# Patient Record
Sex: MALE | Employment: UNEMPLOYED | URBAN - METROPOLITAN AREA
[De-identification: names, ages, dates, MRNs, and addresses within clinical notes are randomized per-mention and may not be internally consistent; named-entity substitution may affect disease eponyms.]

---

## 2021-01-01 ENCOUNTER — HOSPITAL ENCOUNTER (INPATIENT)
Age: 66
LOS: 2 days | DRG: 871 | End: 2021-12-01
Attending: ANESTHESIOLOGY | Admitting: INTERNAL MEDICINE
Payer: MEDICARE

## 2021-01-01 ENCOUNTER — APPOINTMENT (OUTPATIENT)
Dept: GENERAL RADIOLOGY | Age: 66
DRG: 871 | End: 2021-01-01
Attending: INTERNAL MEDICINE
Payer: MEDICARE

## 2021-01-01 ENCOUNTER — APPOINTMENT (OUTPATIENT)
Dept: GENERAL RADIOLOGY | Age: 66
DRG: 871 | End: 2021-01-01
Attending: STUDENT IN AN ORGANIZED HEALTH CARE EDUCATION/TRAINING PROGRAM
Payer: MEDICARE

## 2021-01-01 ENCOUNTER — APPOINTMENT (OUTPATIENT)
Dept: CT IMAGING | Age: 66
DRG: 871 | End: 2021-01-01
Attending: EMERGENCY MEDICINE
Payer: MEDICARE

## 2021-01-01 ENCOUNTER — APPOINTMENT (OUTPATIENT)
Dept: NON INVASIVE DIAGNOSTICS | Age: 66
DRG: 871 | End: 2021-01-01
Attending: INTERNAL MEDICINE
Payer: MEDICARE

## 2021-01-01 VITALS
DIASTOLIC BLOOD PRESSURE: 75 MMHG | WEIGHT: 195.11 LBS | SYSTOLIC BLOOD PRESSURE: 109 MMHG | HEART RATE: 81 BPM | TEMPERATURE: 97 F | BODY MASS INDEX: 29.57 KG/M2 | OXYGEN SATURATION: 100 % | RESPIRATION RATE: 24 BRPM | HEIGHT: 68 IN

## 2021-01-01 DIAGNOSIS — I21.4 NON-STEMI (NON-ST ELEVATED MYOCARDIAL INFARCTION) (HCC): ICD-10-CM

## 2021-01-01 DIAGNOSIS — E11.65 UNCONTROLLED TYPE 2 DIABETES MELLITUS WITH HYPERGLYCEMIA (HCC): ICD-10-CM

## 2021-01-01 DIAGNOSIS — I63.9 ACUTE CVA (CEREBROVASCULAR ACCIDENT) (HCC): ICD-10-CM

## 2021-01-01 DIAGNOSIS — I21.4 NSTEMI (NON-ST ELEVATED MYOCARDIAL INFARCTION) (HCC): ICD-10-CM

## 2021-01-01 DIAGNOSIS — J96.01 ACUTE RESPIRATORY FAILURE WITH HYPOXIA (HCC): ICD-10-CM

## 2021-01-01 LAB
25(OH)D3 SERPL-MCNC: 30.5 NG/ML (ref 30–100)
ADMINISTERED INITIALS, ADMINIT: NORMAL
ALBUMIN SERPL-MCNC: 3 G/DL (ref 3.5–5)
ALBUMIN SERPL-MCNC: 3 G/DL (ref 3.5–5)
ALBUMIN SERPL-MCNC: 3.2 G/DL (ref 3.5–5)
ALBUMIN/GLOB SERPL: 0.7 {RATIO} (ref 1.1–2.2)
ALBUMIN/GLOB SERPL: 0.9 {RATIO} (ref 1.1–2.2)
ALBUMIN/GLOB SERPL: 0.9 {RATIO} (ref 1.1–2.2)
ALP SERPL-CCNC: 53 U/L (ref 45–117)
ALP SERPL-CCNC: 56 U/L (ref 45–117)
ALP SERPL-CCNC: 73 U/L (ref 45–117)
ALT SERPL-CCNC: 1002 U/L (ref 12–78)
ALT SERPL-CCNC: 1057 U/L (ref 12–78)
ALT SERPL-CCNC: 955 U/L (ref 12–78)
ANION GAP SERPL CALC-SCNC: 10 MMOL/L (ref 5–15)
ANION GAP SERPL CALC-SCNC: 10 MMOL/L (ref 5–15)
ANION GAP SERPL CALC-SCNC: 6 MMOL/L (ref 5–15)
ANION GAP SERPL CALC-SCNC: 8 MMOL/L (ref 5–15)
ANION GAP SERPL CALC-SCNC: 8 MMOL/L (ref 5–15)
APTT PPP: 30.8 SEC (ref 22.1–31)
APTT PPP: 34.5 SEC (ref 22.1–31)
APTT PPP: 42.7 SEC (ref 22.1–31)
APTT PPP: 44.8 SEC (ref 22.1–31)
APTT PPP: 51 SEC (ref 22.1–31)
APTT PPP: 59.3 SEC (ref 22.1–31)
ARTERIAL PATENCY WRIST A: ABNORMAL
ARTERIAL PATENCY WRIST A: ABNORMAL
ARTERIAL PATENCY WRIST A: POSITIVE
AST SERPL-CCNC: 1548 U/L (ref 15–37)
AST SERPL-CCNC: 549 U/L (ref 15–37)
AST SERPL-CCNC: 918 U/L (ref 15–37)
ATRIAL RATE: 103 BPM
ATRIAL RATE: 110 BPM
ATRIAL RATE: 143 BPM
B PERT DNA SPEC QL NAA+PROBE: NOT DETECTED
BACTERIA SPEC CULT: NORMAL
BACTERIA SPEC CULT: NORMAL
BASE DEFICIT BLD-SCNC: 6.6 MMOL/L
BASE DEFICIT BLD-SCNC: 8.5 MMOL/L
BASE DEFICIT BLD-SCNC: 8.8 MMOL/L
BASOPHILS # BLD: 0 K/UL (ref 0–0.1)
BASOPHILS # BLD: 0.1 K/UL (ref 0–0.1)
BASOPHILS # BLD: 0.1 K/UL (ref 0–0.1)
BASOPHILS NFR BLD: 0 % (ref 0–1)
BASOPHILS NFR BLD: 1 % (ref 0–1)
BASOPHILS NFR BLD: 1 % (ref 0–1)
BDY SITE: ABNORMAL
BILIRUB SERPL-MCNC: 0.4 MG/DL (ref 0.2–1)
BILIRUB SERPL-MCNC: 0.5 MG/DL (ref 0.2–1)
BILIRUB SERPL-MCNC: 0.6 MG/DL (ref 0.2–1)
BNP SERPL-MCNC: 1963 PG/ML
BNP SERPL-MCNC: 3385 PG/ML
BNP SERPL-MCNC: 5662 PG/ML
BORDETELLA PARAPERTUSSIS PCR, BORPAR: NOT DETECTED
BUN SERPL-MCNC: 26 MG/DL (ref 6–20)
BUN SERPL-MCNC: 26 MG/DL (ref 6–20)
BUN SERPL-MCNC: 28 MG/DL (ref 6–20)
BUN SERPL-MCNC: 31 MG/DL (ref 6–20)
BUN SERPL-MCNC: 34 MG/DL (ref 6–20)
BUN/CREAT SERPL: 17 (ref 12–20)
BUN/CREAT SERPL: 18 (ref 12–20)
BUN/CREAT SERPL: 20 (ref 12–20)
C PNEUM DNA SPEC QL NAA+PROBE: NOT DETECTED
CALCIUM SERPL-MCNC: 7.6 MG/DL (ref 8.5–10.1)
CALCIUM SERPL-MCNC: 7.9 MG/DL (ref 8.5–10.1)
CALCIUM SERPL-MCNC: 7.9 MG/DL (ref 8.5–10.1)
CALCIUM SERPL-MCNC: 8.1 MG/DL (ref 8.5–10.1)
CALCIUM SERPL-MCNC: 8.3 MG/DL (ref 8.5–10.1)
CALCULATED P AXIS, ECG09: 51 DEGREES
CALCULATED P AXIS, ECG09: 8 DEGREES
CALCULATED R AXIS, ECG10: 13 DEGREES
CALCULATED R AXIS, ECG10: 20 DEGREES
CALCULATED R AXIS, ECG10: 42 DEGREES
CALCULATED T AXIS, ECG11: 123 DEGREES
CALCULATED T AXIS, ECG11: 130 DEGREES
CALCULATED T AXIS, ECG11: 159 DEGREES
CHLORIDE SERPL-SCNC: 102 MMOL/L (ref 97–108)
CHLORIDE SERPL-SCNC: 104 MMOL/L (ref 97–108)
CHLORIDE SERPL-SCNC: 108 MMOL/L (ref 97–108)
CHLORIDE SERPL-SCNC: 108 MMOL/L (ref 97–108)
CHLORIDE SERPL-SCNC: 109 MMOL/L (ref 97–108)
CHOLEST SERPL-MCNC: 153 MG/DL
CO2 SERPL-SCNC: 18 MMOL/L (ref 21–32)
CO2 SERPL-SCNC: 20 MMOL/L (ref 21–32)
CO2 SERPL-SCNC: 20 MMOL/L (ref 21–32)
CREAT SERPL-MCNC: 1.27 MG/DL (ref 0.7–1.3)
CREAT SERPL-MCNC: 1.32 MG/DL (ref 0.7–1.3)
CREAT SERPL-MCNC: 1.37 MG/DL (ref 0.7–1.3)
CREAT SERPL-MCNC: 1.82 MG/DL (ref 0.7–1.3)
CREAT SERPL-MCNC: 1.92 MG/DL (ref 0.7–1.3)
D50 ADMINISTERED, D50ADM: 0 ML
D50 ORDER, D50ORD: 0 ML
DIAGNOSIS, 93000: NORMAL
DIFFERENTIAL METHOD BLD: ABNORMAL
ECHO AO ROOT DIAM: 3.01 CM
ECHO AV PEAK GRADIENT: 4.5 MMHG
ECHO AV PEAK VELOCITY: 106.04 CM/S
ECHO LA MAJOR AXIS: 3.16 CM
ECHO LA MINOR AXIS: 1.57 CM
ECHO LV E' LATERAL VELOCITY: 5.16 CM/S
ECHO LV E' SEPTAL VELOCITY: 4.31 CM/S
ECHO LV EDV A2C: 154.78 ML
ECHO LV EDV A4C: 128.4 ML
ECHO LV EDV BP: 143.91 ML (ref 67–155)
ECHO LV EDV INDEX A4C: 63.9 ML/M2
ECHO LV EDV INDEX BP: 71.6 ML/M2
ECHO LV EDV NDEX A2C: 77 ML/M2
ECHO LV EJECTION FRACTION A2C: 38 PERCENT
ECHO LV EJECTION FRACTION A4C: 25 PERCENT
ECHO LV EJECTION FRACTION BIPLANE: 32.7 PERCENT (ref 55–100)
ECHO LV ESV A2C: 95.61 ML
ECHO LV ESV A4C: 95.92 ML
ECHO LV ESV BP: 96.8 ML (ref 22–58)
ECHO LV ESV INDEX A2C: 47.6 ML/M2
ECHO LV ESV INDEX A4C: 47.7 ML/M2
ECHO LV ESV INDEX BP: 48.2 ML/M2
ECHO LV INTERNAL DIMENSION DIASTOLIC: 5.05 CM (ref 4.2–5.9)
ECHO LV INTERNAL DIMENSION SYSTOLIC: 4.34 CM
ECHO LV IVSD: 1.11 CM (ref 0.6–1)
ECHO LV MASS 2D: 192.5 G (ref 88–224)
ECHO LV MASS INDEX 2D: 95.8 G/M2 (ref 49–115)
ECHO LV POSTERIOR WALL DIASTOLIC: 0.95 CM (ref 0.6–1)
ECHO LVOT PEAK GRADIENT: 1.25 MMHG
ECHO LVOT PEAK VELOCITY: 55.87 CM/S
ECHO MV A VELOCITY: 47.61 CM/S
ECHO MV AREA PHT: 9.31 CM2
ECHO MV E DECELERATION TIME (DT): 81.47 MS
ECHO MV E VELOCITY: 64.3 CM/S
ECHO MV E/A RATIO: 1.35
ECHO MV E/E' LATERAL: 12.46
ECHO MV E/E' RATIO (AVERAGED): 13.69
ECHO MV E/E' SEPTAL: 14.92
ECHO MV PRESSURE HALF TIME (PHT): 23.63 MS
ECHO PV MAX VELOCITY: 77.35 CM/S
ECHO PV PEAK INSTANTANEOUS GRADIENT SYSTOLIC: 2.39 MMHG
ECHO RV TAPSE: 1.58 CM (ref 1.5–2)
EOSINOPHIL # BLD: 0 K/UL (ref 0–0.4)
EOSINOPHIL # BLD: 0 K/UL (ref 0–0.4)
EOSINOPHIL # BLD: 0.1 K/UL (ref 0–0.4)
EOSINOPHIL NFR BLD: 0 % (ref 0–7)
EOSINOPHIL NFR BLD: 0 % (ref 0–7)
EOSINOPHIL NFR BLD: 1 % (ref 0–7)
ERYTHROCYTE [DISTWIDTH] IN BLOOD BY AUTOMATED COUNT: 11.9 % (ref 11.5–14.5)
ERYTHROCYTE [DISTWIDTH] IN BLOOD BY AUTOMATED COUNT: 11.9 % (ref 11.5–14.5)
ERYTHROCYTE [DISTWIDTH] IN BLOOD BY AUTOMATED COUNT: 12.2 % (ref 11.5–14.5)
EST. AVERAGE GLUCOSE BLD GHB EST-MCNC: 212 MG/DL
FLUAV H1 2009 PAND RNA SPEC QL NAA+PROBE: NOT DETECTED
FLUAV H1 RNA SPEC QL NAA+PROBE: NOT DETECTED
FLUAV H3 RNA SPEC QL NAA+PROBE: NOT DETECTED
FLUAV SUBTYP SPEC NAA+PROBE: NOT DETECTED
FLUBV RNA SPEC QL NAA+PROBE: NOT DETECTED
GAS FLOW.O2 O2 DELIVERY SYS: ABNORMAL L/MIN
GAS FLOW.O2 SETTING OXYMISER: 16 BPM
GAS FLOW.O2 SETTING OXYMISER: 16 BPM
GAS FLOW.O2 SETTING OXYMISER: 18 BPM
GLOBULIN SER CALC-MCNC: 3.3 G/DL (ref 2–4)
GLOBULIN SER CALC-MCNC: 3.6 G/DL (ref 2–4)
GLOBULIN SER CALC-MCNC: 4.4 G/DL (ref 2–4)
GLSCOM COMMENTS: NORMAL
GLUCOSE BLD STRIP.AUTO-MCNC: 124 MG/DL (ref 65–117)
GLUCOSE BLD STRIP.AUTO-MCNC: 154 MG/DL (ref 65–117)
GLUCOSE BLD STRIP.AUTO-MCNC: 160 MG/DL (ref 65–117)
GLUCOSE BLD STRIP.AUTO-MCNC: 163 MG/DL (ref 65–117)
GLUCOSE BLD STRIP.AUTO-MCNC: 175 MG/DL (ref 65–117)
GLUCOSE BLD STRIP.AUTO-MCNC: 190 MG/DL (ref 65–117)
GLUCOSE BLD STRIP.AUTO-MCNC: 196 MG/DL (ref 65–117)
GLUCOSE BLD STRIP.AUTO-MCNC: 197 MG/DL (ref 65–117)
GLUCOSE BLD STRIP.AUTO-MCNC: 206 MG/DL (ref 65–117)
GLUCOSE BLD STRIP.AUTO-MCNC: 222 MG/DL (ref 65–117)
GLUCOSE BLD STRIP.AUTO-MCNC: 224 MG/DL (ref 65–117)
GLUCOSE BLD STRIP.AUTO-MCNC: 228 MG/DL (ref 65–117)
GLUCOSE BLD STRIP.AUTO-MCNC: 233 MG/DL (ref 65–117)
GLUCOSE SERPL-MCNC: 188 MG/DL (ref 65–100)
GLUCOSE SERPL-MCNC: 222 MG/DL (ref 65–100)
GLUCOSE SERPL-MCNC: 243 MG/DL (ref 65–100)
GLUCOSE SERPL-MCNC: 247 MG/DL (ref 65–100)
GLUCOSE SERPL-MCNC: 255 MG/DL (ref 65–100)
GLUCOSE, GLC: 233 MG/DL
HADV DNA SPEC QL NAA+PROBE: NOT DETECTED
HBA1C MFR BLD: 9 % (ref 4–5.6)
HCO3 BLD-SCNC: 17.1 MMOL/L (ref 22–26)
HCO3 BLD-SCNC: 18.3 MMOL/L (ref 22–26)
HCO3 BLD-SCNC: 20.6 MMOL/L (ref 22–26)
HCOV 229E RNA SPEC QL NAA+PROBE: NOT DETECTED
HCOV HKU1 RNA SPEC QL NAA+PROBE: NOT DETECTED
HCOV NL63 RNA SPEC QL NAA+PROBE: NOT DETECTED
HCOV OC43 RNA SPEC QL NAA+PROBE: NOT DETECTED
HCT VFR BLD AUTO: 34.3 % (ref 36.6–50.3)
HCT VFR BLD AUTO: 38.4 % (ref 36.6–50.3)
HCT VFR BLD AUTO: 39.1 % (ref 36.6–50.3)
HDLC SERPL-MCNC: 34 MG/DL
HDLC SERPL: 4.5 {RATIO} (ref 0–5)
HGB BLD-MCNC: 11.6 G/DL (ref 12.1–17)
HGB BLD-MCNC: 12.8 G/DL (ref 12.1–17)
HGB BLD-MCNC: 13 G/DL (ref 12.1–17)
HIGH TARGET, HITG: 250 MG/DL
HMPV RNA SPEC QL NAA+PROBE: NOT DETECTED
HPIV1 RNA SPEC QL NAA+PROBE: NOT DETECTED
HPIV2 RNA SPEC QL NAA+PROBE: NOT DETECTED
HPIV3 RNA SPEC QL NAA+PROBE: NOT DETECTED
HPIV4 RNA SPEC QL NAA+PROBE: NOT DETECTED
IMM GRANULOCYTES # BLD AUTO: 0 K/UL (ref 0–0.04)
IMM GRANULOCYTES # BLD AUTO: 0.1 K/UL (ref 0–0.04)
IMM GRANULOCYTES # BLD AUTO: 0.1 K/UL (ref 0–0.04)
IMM GRANULOCYTES NFR BLD AUTO: 0 % (ref 0–0.5)
IMM GRANULOCYTES NFR BLD AUTO: 1 % (ref 0–0.5)
IMM GRANULOCYTES NFR BLD AUTO: 1 % (ref 0–0.5)
INSULIN ADMINSTERED, INSADM: 3.5 UNITS/HOUR
INSULIN ORDER, INSORD: 3.5 UNITS/HOUR
L PNEUMO1 AG UR QL IA: NEGATIVE
LACTATE SERPL-SCNC: 1.1 MMOL/L (ref 0.4–2)
LACTATE SERPL-SCNC: 1.3 MMOL/L (ref 0.4–2)
LACTATE SERPL-SCNC: 1.5 MMOL/L (ref 0.4–2)
LDLC SERPL CALC-MCNC: 66 MG/DL (ref 0–100)
LOW TARGET, LOT: 150 MG/DL
LYMPHOCYTES # BLD: 0.9 K/UL (ref 0.8–3.5)
LYMPHOCYTES # BLD: 2 K/UL (ref 0.8–3.5)
LYMPHOCYTES # BLD: 3 K/UL (ref 0.8–3.5)
LYMPHOCYTES NFR BLD: 20 % (ref 12–49)
LYMPHOCYTES NFR BLD: 21 % (ref 12–49)
LYMPHOCYTES NFR BLD: 7 % (ref 12–49)
M PNEUMO DNA SPEC QL NAA+PROBE: NOT DETECTED
M PNEUMO IGM SER IA-ACNC: NONREACTIVE
MAGNESIUM SERPL-MCNC: 1.6 MG/DL (ref 1.6–2.4)
MAGNESIUM SERPL-MCNC: 1.6 MG/DL (ref 1.6–2.4)
MAGNESIUM SERPL-MCNC: 1.8 MG/DL (ref 1.6–2.4)
MAGNESIUM SERPL-MCNC: 1.9 MG/DL (ref 1.6–2.4)
MCH RBC QN AUTO: 30.7 PG (ref 26–34)
MCH RBC QN AUTO: 30.9 PG (ref 26–34)
MCH RBC QN AUTO: 31.2 PG (ref 26–34)
MCHC RBC AUTO-ENTMCNC: 33.2 G/DL (ref 30–36.5)
MCHC RBC AUTO-ENTMCNC: 33.3 G/DL (ref 30–36.5)
MCHC RBC AUTO-ENTMCNC: 33.8 G/DL (ref 30–36.5)
MCV RBC AUTO: 92.2 FL (ref 80–99)
MCV RBC AUTO: 92.4 FL (ref 80–99)
MCV RBC AUTO: 92.8 FL (ref 80–99)
MINUTES UNTIL NEXT BG, NBG: 60 MIN
MONOCYTES # BLD: 0.7 K/UL (ref 0–1)
MONOCYTES # BLD: 1 K/UL (ref 0–1)
MONOCYTES # BLD: 1.6 K/UL (ref 0–1)
MONOCYTES NFR BLD: 10 % (ref 5–13)
MONOCYTES NFR BLD: 11 % (ref 5–13)
MONOCYTES NFR BLD: 6 % (ref 5–13)
MULTIPLIER, MUL: 0.02
NEUTS SEG # BLD: 11 K/UL (ref 1.8–8)
NEUTS SEG # BLD: 7 K/UL (ref 1.8–8)
NEUTS SEG # BLD: 9.6 K/UL (ref 1.8–8)
NEUTS SEG NFR BLD: 67 % (ref 32–75)
NEUTS SEG NFR BLD: 67 % (ref 32–75)
NEUTS SEG NFR BLD: 86 % (ref 32–75)
NRBC # BLD: 0 K/UL (ref 0–0.01)
NRBC BLD-RTO: 0 PER 100 WBC
O2/TOTAL GAS SETTING VFR VENT: 100 %
O2/TOTAL GAS SETTING VFR VENT: 70 %
O2/TOTAL GAS SETTING VFR VENT: 90 %
ORDER INITIALS, ORDINIT: NORMAL
OSMOLALITY SERPL: 333 MOSM/KG H2O
P-R INTERVAL, ECG05: 112 MS
P-R INTERVAL, ECG05: 146 MS
P-R INTERVAL, ECG05: 166 MS
PCO2 BLD: 34.7 MMHG (ref 35–45)
PCO2 BLD: 42.9 MMHG (ref 35–45)
PCO2 BLD: 46.4 MMHG (ref 35–45)
PEEP RESPIRATORY: 14 CMH2O
PH BLD: 7.24 [PH] (ref 7.35–7.45)
PH BLD: 7.26 [PH] (ref 7.35–7.45)
PH BLD: 7.3 [PH] (ref 7.35–7.45)
PHOSPHATE SERPL-MCNC: 2.7 MG/DL (ref 2.6–4.7)
PLATELET # BLD AUTO: 199 K/UL (ref 150–400)
PLATELET # BLD AUTO: 220 K/UL (ref 150–400)
PLATELET # BLD AUTO: 271 K/UL (ref 150–400)
PMV BLD AUTO: 8.6 FL (ref 8.9–12.9)
PMV BLD AUTO: 9 FL (ref 8.9–12.9)
PMV BLD AUTO: 9.1 FL (ref 8.9–12.9)
PO2 BLD: 104 MMHG (ref 80–100)
PO2 BLD: 51 MMHG (ref 80–100)
PO2 BLD: 63 MMHG (ref 80–100)
POTASSIUM SERPL-SCNC: 4.3 MMOL/L (ref 3.5–5.1)
POTASSIUM SERPL-SCNC: 4.4 MMOL/L (ref 3.5–5.1)
POTASSIUM SERPL-SCNC: 4.8 MMOL/L (ref 3.5–5.1)
POTASSIUM SERPL-SCNC: 5 MMOL/L (ref 3.5–5.1)
POTASSIUM SERPL-SCNC: 5.1 MMOL/L (ref 3.5–5.1)
PROCALCITONIN SERPL-MCNC: 0.85 NG/ML
PROT SERPL-MCNC: 6.3 G/DL (ref 6.4–8.2)
PROT SERPL-MCNC: 6.8 G/DL (ref 6.4–8.2)
PROT SERPL-MCNC: 7.4 G/DL (ref 6.4–8.2)
Q-T INTERVAL, ECG07: 310 MS
Q-T INTERVAL, ECG07: 356 MS
Q-T INTERVAL, ECG07: 368 MS
QRS DURATION, ECG06: 118 MS
QRS DURATION, ECG06: 122 MS
QRS DURATION, ECG06: 128 MS
QTC CALCULATION (BEZET), ECG08: 478 MS
QTC CALCULATION (BEZET), ECG08: 481 MS
QTC CALCULATION (BEZET), ECG08: 482 MS
RBC # BLD AUTO: 3.72 M/UL (ref 4.1–5.7)
RBC # BLD AUTO: 4.14 M/UL (ref 4.1–5.7)
RBC # BLD AUTO: 4.23 M/UL (ref 4.1–5.7)
RSV RNA SPEC QL NAA+PROBE: NOT DETECTED
RV+EV RNA SPEC QL NAA+PROBE: DETECTED
SAO2 % BLD: 79.4 % (ref 92–97)
SAO2 % BLD: 87.3 % (ref 92–97)
SAO2 % BLD: 97.4 % (ref 92–97)
SARS-COV-2 PCR, COVPCR: NOT DETECTED
SERVICE CMNT-IMP: ABNORMAL
SERVICE CMNT-IMP: NORMAL
SODIUM SERPL-SCNC: 132 MMOL/L (ref 136–145)
SODIUM SERPL-SCNC: 132 MMOL/L (ref 136–145)
SODIUM SERPL-SCNC: 134 MMOL/L (ref 136–145)
SODIUM SERPL-SCNC: 134 MMOL/L (ref 136–145)
SODIUM SERPL-SCNC: 135 MMOL/L (ref 136–145)
SPECIMEN SOURCE: NORMAL
SPECIMEN TYPE: ABNORMAL
THERAPEUTIC RANGE,PTTT: ABNORMAL SECS (ref 58–77)
THERAPEUTIC RANGE,PTTT: NORMAL SECS (ref 58–77)
TRIGL SERPL-MCNC: 265 MG/DL (ref ?–150)
TROPONIN-HIGH SENSITIVITY: 1915 NG/L (ref 0–76)
TROPONIN-HIGH SENSITIVITY: 6378 NG/L (ref 0–76)
TROPONIN-HIGH SENSITIVITY: ABNORMAL NG/L (ref 0–76)
TSH SERPL DL<=0.05 MIU/L-ACNC: 0.84 UIU/ML (ref 0.36–3.74)
VANCOMYCIN SERPL-MCNC: 7.9 UG/ML
VENTILATION MODE VENT: ABNORMAL
VENTRICULAR RATE, ECG03: 103 BPM
VENTRICULAR RATE, ECG03: 110 BPM
VENTRICULAR RATE, ECG03: 143 BPM
VLDLC SERPL CALC-MCNC: 53 MG/DL
VT SETTING VENT: 420 ML
VT SETTING VENT: 450 ML
VT SETTING VENT: 480 ML
WBC # BLD AUTO: 10.1 K/UL (ref 4.1–11.1)
WBC # BLD AUTO: 12.7 K/UL (ref 4.1–11.1)
WBC # BLD AUTO: 14.2 K/UL (ref 4.1–11.1)

## 2021-01-01 PROCEDURE — 83880 ASSAY OF NATRIURETIC PEPTIDE: CPT

## 2021-01-01 PROCEDURE — 85730 THROMBOPLASTIN TIME PARTIAL: CPT

## 2021-01-01 PROCEDURE — 85025 COMPLETE CBC W/AUTO DIFF WBC: CPT

## 2021-01-01 PROCEDURE — 82803 BLOOD GASES ANY COMBINATION: CPT

## 2021-01-01 PROCEDURE — 87040 BLOOD CULTURE FOR BACTERIA: CPT

## 2021-01-01 PROCEDURE — 36415 COLL VENOUS BLD VENIPUNCTURE: CPT

## 2021-01-01 PROCEDURE — 99233 SBSQ HOSP IP/OBS HIGH 50: CPT | Performed by: INTERNAL MEDICINE

## 2021-01-01 PROCEDURE — 83605 ASSAY OF LACTIC ACID: CPT

## 2021-01-01 PROCEDURE — 0BH17EZ INSERTION OF ENDOTRACHEAL AIRWAY INTO TRACHEA, VIA NATURAL OR ARTIFICIAL OPENING: ICD-10-PCS | Performed by: STUDENT IN AN ORGANIZED HEALTH CARE EDUCATION/TRAINING PROGRAM

## 2021-01-01 PROCEDURE — 74011000250 HC RX REV CODE- 250: Performed by: INTERNAL MEDICINE

## 2021-01-01 PROCEDURE — 74011250636 HC RX REV CODE- 250/636: Performed by: EMERGENCY MEDICINE

## 2021-01-01 PROCEDURE — 74011250636 HC RX REV CODE- 250/636: Performed by: INTERNAL MEDICINE

## 2021-01-01 PROCEDURE — 74011636637 HC RX REV CODE- 636/637: Performed by: INTERNAL MEDICINE

## 2021-01-01 PROCEDURE — 0202U NFCT DS 22 TRGT SARS-COV-2: CPT

## 2021-01-01 PROCEDURE — 80061 LIPID PANEL: CPT

## 2021-01-01 PROCEDURE — 99223 1ST HOSP IP/OBS HIGH 75: CPT | Performed by: PSYCHIATRY & NEUROLOGY

## 2021-01-01 PROCEDURE — P9045 ALBUMIN (HUMAN), 5%, 250 ML: HCPCS | Performed by: STUDENT IN AN ORGANIZED HEALTH CARE EDUCATION/TRAINING PROGRAM

## 2021-01-01 PROCEDURE — 74011000250 HC RX REV CODE- 250: Performed by: EMERGENCY MEDICINE

## 2021-01-01 PROCEDURE — 94002 VENT MGMT INPAT INIT DAY: CPT

## 2021-01-01 PROCEDURE — 74011250637 HC RX REV CODE- 250/637: Performed by: INTERNAL MEDICINE

## 2021-01-01 PROCEDURE — 84484 ASSAY OF TROPONIN QUANT: CPT

## 2021-01-01 PROCEDURE — 80053 COMPREHEN METABOLIC PANEL: CPT

## 2021-01-01 PROCEDURE — 71045 X-RAY EXAM CHEST 1 VIEW: CPT

## 2021-01-01 PROCEDURE — 84443 ASSAY THYROID STIM HORMONE: CPT

## 2021-01-01 PROCEDURE — C8929 TTE W OR WO FOL WCON,DOPPLER: HCPCS

## 2021-01-01 PROCEDURE — 84145 PROCALCITONIN (PCT): CPT

## 2021-01-01 PROCEDURE — 83735 ASSAY OF MAGNESIUM: CPT

## 2021-01-01 PROCEDURE — 74011636637 HC RX REV CODE- 636/637: Performed by: EMERGENCY MEDICINE

## 2021-01-01 PROCEDURE — 87449 NOS EACH ORGANISM AG IA: CPT

## 2021-01-01 PROCEDURE — 82962 GLUCOSE BLOOD TEST: CPT

## 2021-01-01 PROCEDURE — 74011250637 HC RX REV CODE- 250/637: Performed by: STUDENT IN AN ORGANIZED HEALTH CARE EDUCATION/TRAINING PROGRAM

## 2021-01-01 PROCEDURE — 82306 VITAMIN D 25 HYDROXY: CPT

## 2021-01-01 PROCEDURE — 74011000258 HC RX REV CODE- 258: Performed by: INTERNAL MEDICINE

## 2021-01-01 PROCEDURE — 74011000258 HC RX REV CODE- 258: Performed by: EMERGENCY MEDICINE

## 2021-01-01 PROCEDURE — 86738 MYCOPLASMA ANTIBODY: CPT

## 2021-01-01 PROCEDURE — 71250 CT THORAX DX C-: CPT

## 2021-01-01 PROCEDURE — 74011250637 HC RX REV CODE- 250/637: Performed by: NURSE PRACTITIONER

## 2021-01-01 PROCEDURE — 93005 ELECTROCARDIOGRAM TRACING: CPT

## 2021-01-01 PROCEDURE — 74011000250 HC RX REV CODE- 250

## 2021-01-01 PROCEDURE — 94003 VENT MGMT INPAT SUBQ DAY: CPT

## 2021-01-01 PROCEDURE — 70450 CT HEAD/BRAIN W/O DYE: CPT

## 2021-01-01 PROCEDURE — 74011250637 HC RX REV CODE- 250/637: Performed by: EMERGENCY MEDICINE

## 2021-01-01 PROCEDURE — 74011000250 HC RX REV CODE- 250: Performed by: NURSE PRACTITIONER

## 2021-01-01 PROCEDURE — 99223 1ST HOSP IP/OBS HIGH 75: CPT | Performed by: INTERNAL MEDICINE

## 2021-01-01 PROCEDURE — 77030018798 HC PMP KT ENTRL FED COVD -A

## 2021-01-01 PROCEDURE — 65620000000 HC RM CCU GENERAL

## 2021-01-01 PROCEDURE — 83930 ASSAY OF BLOOD OSMOLALITY: CPT

## 2021-01-01 PROCEDURE — 36600 WITHDRAWAL OF ARTERIAL BLOOD: CPT

## 2021-01-01 PROCEDURE — 74018 RADEX ABDOMEN 1 VIEW: CPT

## 2021-01-01 PROCEDURE — 84100 ASSAY OF PHOSPHORUS: CPT

## 2021-01-01 PROCEDURE — 74011250636 HC RX REV CODE- 250/636: Performed by: STUDENT IN AN ORGANIZED HEALTH CARE EDUCATION/TRAINING PROGRAM

## 2021-01-01 PROCEDURE — 5A1945Z RESPIRATORY VENTILATION, 24-96 CONSECUTIVE HOURS: ICD-10-PCS | Performed by: STUDENT IN AN ORGANIZED HEALTH CARE EDUCATION/TRAINING PROGRAM

## 2021-01-01 PROCEDURE — 93306 TTE W/DOPPLER COMPLETE: CPT | Performed by: INTERNAL MEDICINE

## 2021-01-01 PROCEDURE — 80202 ASSAY OF VANCOMYCIN: CPT

## 2021-01-01 PROCEDURE — 83036 HEMOGLOBIN GLYCOSYLATED A1C: CPT

## 2021-01-01 RX ORDER — DEXTROSE 50 % IN WATER (D50W) INTRAVENOUS SYRINGE
25-50 AS NEEDED
Status: DISCONTINUED | OUTPATIENT
Start: 2021-01-01 | End: 2021-01-01

## 2021-01-01 RX ORDER — METFORMIN HYDROCHLORIDE 1000 MG/1
1000 TABLET ORAL 2 TIMES DAILY WITH MEALS
COMMUNITY

## 2021-01-01 RX ORDER — IBUPROFEN 800 MG/1
800 TABLET ORAL
COMMUNITY

## 2021-01-01 RX ORDER — UREA 10 %
100 LOTION (ML) TOPICAL DAILY
COMMUNITY

## 2021-01-01 RX ORDER — MORPHINE SULFATE 2 MG/ML
2 INJECTION, SOLUTION INTRAMUSCULAR; INTRAVENOUS
Status: DISCONTINUED | OUTPATIENT
Start: 2021-01-01 | End: 2021-12-02 | Stop reason: HOSPADM

## 2021-01-01 RX ORDER — ONDANSETRON 2 MG/ML
4 INJECTION INTRAMUSCULAR; INTRAVENOUS
Status: DISCONTINUED | OUTPATIENT
Start: 2021-01-01 | End: 2021-12-02 | Stop reason: HOSPADM

## 2021-01-01 RX ORDER — SODIUM CHLORIDE 0.9 % (FLUSH) 0.9 %
5-40 SYRINGE (ML) INJECTION EVERY 8 HOURS
Status: DISCONTINUED | OUTPATIENT
Start: 2021-01-01 | End: 2021-01-01

## 2021-01-01 RX ORDER — GABAPENTIN 300 MG/1
300 CAPSULE ORAL 3 TIMES DAILY
Status: DISCONTINUED | OUTPATIENT
Start: 2021-01-01 | End: 2021-01-01

## 2021-01-01 RX ORDER — FAMOTIDINE 20 MG/1
20 TABLET, FILM COATED ORAL EVERY 12 HOURS
Status: DISCONTINUED | OUTPATIENT
Start: 2021-01-01 | End: 2021-01-01

## 2021-01-01 RX ORDER — BUMETANIDE 0.25 MG/ML
1 INJECTION INTRAMUSCULAR; INTRAVENOUS 2 TIMES DAILY
Status: DISCONTINUED | OUTPATIENT
Start: 2021-01-01 | End: 2021-01-01

## 2021-01-01 RX ORDER — MAGNESIUM SULFATE 100 %
4 CRYSTALS MISCELLANEOUS AS NEEDED
Status: DISCONTINUED | OUTPATIENT
Start: 2021-01-01 | End: 2021-12-02 | Stop reason: HOSPADM

## 2021-01-01 RX ORDER — MANNITOL 20 G/100ML
1 INJECTION, SOLUTION INTRAVENOUS ONCE
Status: COMPLETED | OUTPATIENT
Start: 2021-01-01 | End: 2021-01-01

## 2021-01-01 RX ORDER — ONDANSETRON 4 MG/1
4 TABLET, ORALLY DISINTEGRATING ORAL
Status: DISCONTINUED | OUTPATIENT
Start: 2021-01-01 | End: 2021-12-02 | Stop reason: HOSPADM

## 2021-01-01 RX ORDER — MAGNESIUM SULFATE 100 %
4 CRYSTALS MISCELLANEOUS AS NEEDED
Status: DISCONTINUED | OUTPATIENT
Start: 2021-01-01 | End: 2021-01-01

## 2021-01-01 RX ORDER — CHLORHEXIDINE GLUCONATE 0.12 MG/ML
15 RINSE ORAL EVERY 12 HOURS
Status: DISCONTINUED | OUTPATIENT
Start: 2021-01-01 | End: 2021-12-02 | Stop reason: HOSPADM

## 2021-01-01 RX ORDER — FUROSEMIDE 10 MG/ML
40 INJECTION INTRAMUSCULAR; INTRAVENOUS ONCE
Status: COMPLETED | OUTPATIENT
Start: 2021-01-01 | End: 2021-01-01

## 2021-01-01 RX ORDER — HEPARIN SODIUM 1000 [USP'U]/ML
2000 INJECTION, SOLUTION INTRAVENOUS; SUBCUTANEOUS ONCE
Status: COMPLETED | OUTPATIENT
Start: 2021-01-01 | End: 2021-01-01

## 2021-01-01 RX ORDER — ACETAMINOPHEN 650 MG/1
650 SUPPOSITORY RECTAL
Status: DISCONTINUED | OUTPATIENT
Start: 2021-01-01 | End: 2021-12-02 | Stop reason: HOSPADM

## 2021-01-01 RX ORDER — VITAMIN E CAP 100 UNIT 100 UNIT
CAP ORAL DAILY
COMMUNITY

## 2021-01-01 RX ORDER — LORAZEPAM 2 MG/ML
2 INJECTION INTRAMUSCULAR
Status: DISCONTINUED | OUTPATIENT
Start: 2021-01-01 | End: 2021-12-02 | Stop reason: HOSPADM

## 2021-01-01 RX ORDER — ASCORBIC ACID 250 MG
TABLET ORAL
COMMUNITY

## 2021-01-01 RX ORDER — LEVOTHYROXINE SODIUM 137 UG/1
137 TABLET ORAL
COMMUNITY

## 2021-01-01 RX ORDER — PROPOFOL 10 MG/ML
0-50 VIAL (ML) INTRAVENOUS
Status: DISCONTINUED | OUTPATIENT
Start: 2021-01-01 | End: 2021-12-02 | Stop reason: HOSPADM

## 2021-01-01 RX ORDER — ROCURONIUM BROMIDE 10 MG/ML
50 INJECTION, SOLUTION INTRAVENOUS
Status: COMPLETED | OUTPATIENT
Start: 2021-01-01 | End: 2021-01-01

## 2021-01-01 RX ORDER — INSULIN GLARGINE 100 [IU]/ML
0.2 INJECTION, SOLUTION SUBCUTANEOUS DAILY
Status: DISCONTINUED | OUTPATIENT
Start: 2021-01-01 | End: 2021-01-01

## 2021-01-01 RX ORDER — CHOLECALCIFEROL (VITAMIN D3) 50 MCG
CAPSULE ORAL
COMMUNITY

## 2021-01-01 RX ORDER — IBUPROFEN 200 MG
1 TABLET ORAL DAILY
Status: DISCONTINUED | OUTPATIENT
Start: 2021-01-01 | End: 2021-12-02 | Stop reason: HOSPADM

## 2021-01-01 RX ORDER — BUMETANIDE 0.25 MG/ML
1 INJECTION INTRAMUSCULAR; INTRAVENOUS ONCE
Status: COMPLETED | OUTPATIENT
Start: 2021-01-01 | End: 2021-01-01

## 2021-01-01 RX ORDER — HYDROMORPHONE HYDROCHLORIDE 2 MG/ML
2 INJECTION, SOLUTION INTRAMUSCULAR; INTRAVENOUS; SUBCUTANEOUS ONCE
Status: COMPLETED | OUTPATIENT
Start: 2021-01-01 | End: 2021-01-01

## 2021-01-01 RX ORDER — SODIUM CHLORIDE 0.9 % (FLUSH) 0.9 %
5-40 SYRINGE (ML) INJECTION AS NEEDED
Status: DISCONTINUED | OUTPATIENT
Start: 2021-01-01 | End: 2021-12-02 | Stop reason: HOSPADM

## 2021-01-01 RX ORDER — POLYETHYLENE GLYCOL 3350 17 G/17G
17 POWDER, FOR SOLUTION ORAL DAILY
Status: DISCONTINUED | OUTPATIENT
Start: 2021-01-01 | End: 2021-01-01

## 2021-01-01 RX ORDER — SCOLOPAMINE TRANSDERMAL SYSTEM 1 MG/1
1 PATCH, EXTENDED RELEASE TRANSDERMAL
Status: DISCONTINUED | OUTPATIENT
Start: 2021-01-01 | End: 2021-12-02 | Stop reason: HOSPADM

## 2021-01-01 RX ORDER — VANCOMYCIN/0.9 % SOD CHLORIDE 1.5G/250ML
1500 PLASTIC BAG, INJECTION (ML) INTRAVENOUS ONCE
Status: COMPLETED | OUTPATIENT
Start: 2021-01-01 | End: 2021-01-01

## 2021-01-01 RX ORDER — GABAPENTIN 300 MG/1
300 CAPSULE ORAL 3 TIMES DAILY
COMMUNITY

## 2021-01-01 RX ORDER — ACETAMINOPHEN 325 MG/1
650 TABLET ORAL
Status: DISCONTINUED | OUTPATIENT
Start: 2021-01-01 | End: 2021-12-02 | Stop reason: HOSPADM

## 2021-01-01 RX ORDER — INSULIN LISPRO 100 [IU]/ML
INJECTION, SOLUTION INTRAVENOUS; SUBCUTANEOUS
Status: DISCONTINUED | OUTPATIENT
Start: 2021-01-01 | End: 2021-01-01

## 2021-01-01 RX ORDER — MAGNESIUM SULFATE 1 G/100ML
1 INJECTION INTRAVENOUS ONCE
Status: COMPLETED | OUTPATIENT
Start: 2021-01-01 | End: 2021-01-01

## 2021-01-01 RX ORDER — DOCUSATE SODIUM 50 MG/5ML
100 LIQUID ORAL DAILY
Status: DISCONTINUED | OUTPATIENT
Start: 2021-01-01 | End: 2021-01-01

## 2021-01-01 RX ORDER — DEXTROSE 50 % IN WATER (D50W) INTRAVENOUS SYRINGE
25-50 AS NEEDED
Status: DISCONTINUED | OUTPATIENT
Start: 2021-01-01 | End: 2021-12-02 | Stop reason: HOSPADM

## 2021-01-01 RX ORDER — BUMETANIDE 0.25 MG/ML
1 INJECTION INTRAMUSCULAR; INTRAVENOUS 2 TIMES DAILY
Status: CANCELLED | OUTPATIENT
Start: 2021-01-01

## 2021-01-01 RX ORDER — GLYCOPYRROLATE 0.2 MG/ML
0.2 INJECTION INTRAMUSCULAR; INTRAVENOUS
Status: DISCONTINUED | OUTPATIENT
Start: 2021-01-01 | End: 2021-12-02 | Stop reason: HOSPADM

## 2021-01-01 RX ORDER — HEPARIN SODIUM 1000 [USP'U]/ML
4000 INJECTION, SOLUTION INTRAVENOUS; SUBCUTANEOUS ONCE
Status: COMPLETED | OUTPATIENT
Start: 2021-01-01 | End: 2021-01-01

## 2021-01-01 RX ORDER — METOPROLOL TARTRATE 5 MG/5ML
5 INJECTION INTRAVENOUS EVERY 6 HOURS
Status: DISCONTINUED | OUTPATIENT
Start: 2021-01-01 | End: 2021-01-01

## 2021-01-01 RX ORDER — MIDAZOLAM HYDROCHLORIDE 1 MG/ML
4 INJECTION, SOLUTION INTRAMUSCULAR; INTRAVENOUS ONCE
Status: COMPLETED | OUTPATIENT
Start: 2021-01-01 | End: 2021-01-01

## 2021-01-01 RX ORDER — HEPARIN SODIUM 1000 [USP'U]/ML
30 INJECTION, SOLUTION INTRAVENOUS; SUBCUTANEOUS ONCE
Status: DISCONTINUED | OUTPATIENT
Start: 2021-01-01 | End: 2021-01-01

## 2021-01-01 RX ORDER — GUAIFENESIN 100 MG/5ML
81 LIQUID (ML) ORAL
Status: COMPLETED | OUTPATIENT
Start: 2021-01-01 | End: 2021-01-01

## 2021-01-01 RX ORDER — POLYETHYLENE GLYCOL 3350 17 G/17G
17 POWDER, FOR SOLUTION ORAL DAILY PRN
Status: DISCONTINUED | OUTPATIENT
Start: 2021-01-01 | End: 2021-12-02 | Stop reason: HOSPADM

## 2021-01-01 RX ORDER — GUAIFENESIN 100 MG/5ML
81 LIQUID (ML) ORAL DAILY
Status: DISCONTINUED | OUTPATIENT
Start: 2021-01-01 | End: 2021-01-01

## 2021-01-01 RX ORDER — ROCURONIUM BROMIDE 10 MG/ML
INJECTION, SOLUTION INTRAVENOUS
Status: COMPLETED
Start: 2021-01-01 | End: 2021-01-01

## 2021-01-01 RX ORDER — HEPARIN SODIUM 10000 [USP'U]/100ML
11-25 INJECTION, SOLUTION INTRAVENOUS
Status: DISCONTINUED | OUTPATIENT
Start: 2021-01-01 | End: 2021-12-02 | Stop reason: HOSPADM

## 2021-01-01 RX ORDER — MELATONIN
DAILY
COMMUNITY

## 2021-01-01 RX ORDER — ALBUMIN HUMAN 50 G/1000ML
25 SOLUTION INTRAVENOUS ONCE
Status: COMPLETED | OUTPATIENT
Start: 2021-01-01 | End: 2021-01-01

## 2021-01-01 RX ORDER — NOREPINEPHRINE BITARTRATE/D5W 8 MG/250ML
.5-3 PLASTIC BAG, INJECTION (ML) INTRAVENOUS
Status: DISCONTINUED | OUTPATIENT
Start: 2021-01-01 | End: 2021-12-02 | Stop reason: HOSPADM

## 2021-01-01 RX ORDER — LISINOPRIL AND HYDROCHLOROTHIAZIDE 12.5; 2 MG/1; MG/1
1 TABLET ORAL DAILY
COMMUNITY

## 2021-01-01 RX ORDER — GLIPIZIDE 10 MG/1
10 TABLET ORAL DAILY
COMMUNITY

## 2021-01-01 RX ORDER — MULTIVITAMIN
TABLET ORAL
COMMUNITY

## 2021-01-01 RX ORDER — SODIUM CHLORIDE 9 MG/ML
75 INJECTION, SOLUTION INTRAVENOUS CONTINUOUS
Status: DISCONTINUED | OUTPATIENT
Start: 2021-01-01 | End: 2021-01-01

## 2021-01-01 RX ORDER — MAGNESIUM SULFATE HEPTAHYDRATE 40 MG/ML
2 INJECTION, SOLUTION INTRAVENOUS ONCE
Status: COMPLETED | OUTPATIENT
Start: 2021-01-01 | End: 2021-01-01

## 2021-01-01 RX ORDER — ENOXAPARIN SODIUM 100 MG/ML
40 INJECTION SUBCUTANEOUS DAILY
Status: DISCONTINUED | OUTPATIENT
Start: 2021-01-01 | End: 2021-01-01

## 2021-01-01 RX ORDER — AMLODIPINE BESYLATE 5 MG/1
5 TABLET ORAL DAILY
COMMUNITY

## 2021-01-01 RX ORDER — INSULIN GLARGINE 100 [IU]/ML
25 INJECTION, SOLUTION SUBCUTANEOUS DAILY
Status: DISCONTINUED | OUTPATIENT
Start: 2021-01-01 | End: 2021-01-01

## 2021-01-01 RX ORDER — INSULIN LISPRO 100 [IU]/ML
INJECTION, SOLUTION INTRAVENOUS; SUBCUTANEOUS EVERY 4 HOURS
Status: DISCONTINUED | OUTPATIENT
Start: 2021-01-01 | End: 2021-01-01

## 2021-01-01 RX ADMIN — MANNITOL 88.5 G: 20 INJECTION, SOLUTION INTRAVENOUS at 13:09

## 2021-01-01 RX ADMIN — HEPARIN SODIUM 2000 UNITS: 1000 INJECTION INTRAVENOUS; SUBCUTANEOUS at 11:09

## 2021-01-01 RX ADMIN — VANCOMYCIN HYDROCHLORIDE 2250 MG: 10 INJECTION, POWDER, LYOPHILIZED, FOR SOLUTION INTRAVENOUS at 17:54

## 2021-01-01 RX ADMIN — ACETAMINOPHEN ORAL SOLUTION 650 MG: 650 SOLUTION ORAL at 10:13

## 2021-01-01 RX ADMIN — PROPOFOL 25 MCG/KG/MIN: 10 INJECTION, EMULSION INTRAVENOUS at 22:15

## 2021-01-01 RX ADMIN — HYDROMORPHONE HYDROCHLORIDE 2 MG: 2 INJECTION INTRAMUSCULAR; INTRAVENOUS; SUBCUTANEOUS at 02:06

## 2021-01-01 RX ADMIN — CHLORHEXIDINE GLUCONATE 15 ML: 0.12 RINSE ORAL at 08:35

## 2021-01-01 RX ADMIN — MAGNESIUM SULFATE HEPTAHYDRATE 2 G: 2 INJECTION, SOLUTION INTRAVENOUS at 08:37

## 2021-01-01 RX ADMIN — INSULIN LISPRO 2 UNITS: 100 INJECTION, SOLUTION INTRAVENOUS; SUBCUTANEOUS at 04:16

## 2021-01-01 RX ADMIN — INSULIN GLARGINE 17 UNITS: 100 INJECTION, SOLUTION SUBCUTANEOUS at 18:36

## 2021-01-01 RX ADMIN — INSULIN LISPRO 3 UNITS: 100 INJECTION, SOLUTION INTRAVENOUS; SUBCUTANEOUS at 06:32

## 2021-01-01 RX ADMIN — FAMOTIDINE 20 MG: 20 TABLET ORAL at 21:10

## 2021-01-01 RX ADMIN — LORAZEPAM 2 MG: 2 INJECTION INTRAMUSCULAR; INTRAVENOUS at 20:21

## 2021-01-01 RX ADMIN — CEFEPIME 2 G: 2 INJECTION, POWDER, FOR SOLUTION INTRAVENOUS at 06:27

## 2021-01-01 RX ADMIN — BUMETANIDE 1 MG: 0.25 INJECTION INTRAMUSCULAR; INTRAVENOUS at 17:01

## 2021-01-01 RX ADMIN — PROPOFOL 30 MCG/KG/MIN: 10 INJECTION, EMULSION INTRAVENOUS at 01:38

## 2021-01-01 RX ADMIN — CHLORHEXIDINE GLUCONATE 15 ML: 0.12 RINSE ORAL at 08:39

## 2021-01-01 RX ADMIN — HEPARIN SODIUM AND DEXTROSE 17 UNITS/KG/HR: 10000; 5 INJECTION INTRAVENOUS at 13:05

## 2021-01-01 RX ADMIN — Medication 50 MCG/HR: at 23:42

## 2021-01-01 RX ADMIN — Medication 10 ML: at 06:27

## 2021-01-01 RX ADMIN — CEFEPIME 2 G: 2 INJECTION, POWDER, FOR SOLUTION INTRAVENOUS at 17:01

## 2021-01-01 RX ADMIN — GABAPENTIN 300 MG: 300 CAPSULE ORAL at 15:11

## 2021-01-01 RX ADMIN — CEFEPIME 2 G: 2 INJECTION, POWDER, FOR SOLUTION INTRAVENOUS at 18:02

## 2021-01-01 RX ADMIN — DOCUSATE SODIUM 100 MG: 50 LIQUID ORAL at 12:00

## 2021-01-01 RX ADMIN — ASPIRIN 81 MG CHEWABLE TABLET 81 MG: 81 TABLET CHEWABLE at 14:55

## 2021-01-01 RX ADMIN — CEFEPIME 2 G: 2 INJECTION, POWDER, FOR SOLUTION INTRAVENOUS at 18:36

## 2021-01-01 RX ADMIN — Medication 10 ML: at 06:08

## 2021-01-01 RX ADMIN — CEFEPIME 2 G: 2 INJECTION, POWDER, FOR SOLUTION INTRAVENOUS at 06:07

## 2021-01-01 RX ADMIN — HEPARIN SODIUM 2000 UNITS: 1000 INJECTION, SOLUTION INTRAVENOUS; SUBCUTANEOUS at 04:25

## 2021-01-01 RX ADMIN — INSULIN LISPRO 2 UNITS: 100 INJECTION, SOLUTION INTRAVENOUS; SUBCUTANEOUS at 12:15

## 2021-01-01 RX ADMIN — INSULIN LISPRO 2 UNITS: 100 INJECTION, SOLUTION INTRAVENOUS; SUBCUTANEOUS at 08:34

## 2021-01-01 RX ADMIN — BUMETANIDE 1 MG: 0.25 INJECTION INTRAMUSCULAR; INTRAVENOUS at 08:34

## 2021-01-01 RX ADMIN — Medication 50 MCG/HR: at 17:39

## 2021-01-01 RX ADMIN — NOREPINEPHRINE BITARTRATE 16 MCG/MIN: 1 SOLUTION INTRAVENOUS at 17:39

## 2021-01-01 RX ADMIN — PROPOFOL 25 MCG/KG/MIN: 10 INJECTION, EMULSION INTRAVENOUS at 19:14

## 2021-01-01 RX ADMIN — ROCURONIUM BROMIDE 50 MG: 10 INJECTION, SOLUTION INTRAVENOUS at 02:25

## 2021-01-01 RX ADMIN — INSULIN LISPRO 3 UNITS: 100 INJECTION, SOLUTION INTRAVENOUS; SUBCUTANEOUS at 18:13

## 2021-01-01 RX ADMIN — INSULIN LISPRO 2 UNITS: 100 INJECTION, SOLUTION INTRAVENOUS; SUBCUTANEOUS at 15:03

## 2021-01-01 RX ADMIN — PROPOFOL 30 MCG/KG/MIN: 10 INJECTION, EMULSION INTRAVENOUS at 09:39

## 2021-01-01 RX ADMIN — PROPOFOL 25 MCG/KG/MIN: 10 INJECTION, EMULSION INTRAVENOUS at 17:30

## 2021-01-01 RX ADMIN — PROPOFOL 25 MCG/KG/MIN: 10 INJECTION, EMULSION INTRAVENOUS at 12:56

## 2021-01-01 RX ADMIN — ALBUMIN (HUMAN) 25 G: 12.5 INJECTION, SOLUTION INTRAVENOUS at 02:49

## 2021-01-01 RX ADMIN — MORPHINE SULFATE 2 MG: 2 INJECTION, SOLUTION INTRAMUSCULAR; INTRAVENOUS at 20:21

## 2021-01-01 RX ADMIN — INSULIN LISPRO 2 UNITS: 100 INJECTION, SOLUTION INTRAVENOUS; SUBCUTANEOUS at 23:31

## 2021-01-01 RX ADMIN — MAGNESIUM SULFATE 1 G: 1 INJECTION INTRAVENOUS at 06:07

## 2021-01-01 RX ADMIN — Medication 10 ML: at 21:10

## 2021-01-01 RX ADMIN — SODIUM CHLORIDE 75 ML/HR: 9 INJECTION, SOLUTION INTRAVENOUS at 18:40

## 2021-01-01 RX ADMIN — POLYETHYLENE GLYCOL 3350 17 G: 17 POWDER, FOR SOLUTION ORAL at 12:33

## 2021-01-01 RX ADMIN — MIDAZOLAM HYDROCHLORIDE 4 MG: 1 INJECTION, SOLUTION INTRAMUSCULAR; INTRAVENOUS at 01:46

## 2021-01-01 RX ADMIN — FAMOTIDINE 20 MG: 20 TABLET ORAL at 22:38

## 2021-01-01 RX ADMIN — FAMOTIDINE 20 MG: 20 TABLET ORAL at 08:37

## 2021-01-01 RX ADMIN — PERFLUTREN 1.5 ML: 6.52 INJECTION, SUSPENSION INTRAVENOUS at 10:00

## 2021-01-01 RX ADMIN — CHLORHEXIDINE GLUCONATE 15 ML: 0.12 RINSE ORAL at 21:10

## 2021-01-01 RX ADMIN — INSULIN GLARGINE 17 UNITS: 100 INJECTION, SOLUTION SUBCUTANEOUS at 08:34

## 2021-01-01 RX ADMIN — INSULIN LISPRO 2 UNITS: 100 INJECTION, SOLUTION INTRAVENOUS; SUBCUTANEOUS at 03:36

## 2021-01-01 RX ADMIN — HEPARIN SODIUM AND DEXTROSE 11 UNITS/KG/HR: 10000; 5 INJECTION INTRAVENOUS at 18:40

## 2021-01-01 RX ADMIN — LEVOTHYROXINE SODIUM 137 MCG: 0.11 TABLET ORAL at 12:33

## 2021-01-01 RX ADMIN — VANCOMYCIN HYDROCHLORIDE 1500 MG: 10 INJECTION, POWDER, LYOPHILIZED, FOR SOLUTION INTRAVENOUS at 19:21

## 2021-01-01 RX ADMIN — ASPIRIN 81 MG CHEWABLE TABLET 81 MG: 81 TABLET CHEWABLE at 18:36

## 2021-01-01 RX ADMIN — HEPARIN SODIUM 4000 UNITS: 1000 INJECTION INTRAVENOUS; SUBCUTANEOUS at 02:10

## 2021-01-01 RX ADMIN — CHLORHEXIDINE GLUCONATE 15 ML: 0.12 RINSE ORAL at 22:36

## 2021-01-01 RX ADMIN — FAMOTIDINE 20 MG: 20 TABLET ORAL at 08:34

## 2021-01-01 RX ADMIN — ACETAMINOPHEN 650 MG: 325 TABLET ORAL at 12:12

## 2021-01-01 RX ADMIN — INSULIN LISPRO 2 UNITS: 100 INJECTION, SOLUTION INTRAVENOUS; SUBCUTANEOUS at 00:32

## 2021-01-01 RX ADMIN — FUROSEMIDE 40 MG: 10 INJECTION, SOLUTION INTRAMUSCULAR; INTRAVENOUS at 00:33

## 2021-01-01 RX ADMIN — INSULIN LISPRO 3 UNITS: 100 INJECTION, SOLUTION INTRAVENOUS; SUBCUTANEOUS at 15:11

## 2021-01-01 RX ADMIN — ASPIRIN 81 MG CHEWABLE TABLET 81 MG: 81 TABLET CHEWABLE at 08:37

## 2021-01-01 RX ADMIN — HEPARIN SODIUM AND DEXTROSE 20 UNITS/KG/HR: 10000; 5 INJECTION INTRAVENOUS at 04:26

## 2021-01-01 RX ADMIN — Medication 200 MCG/HR: at 09:40

## 2021-01-01 RX ADMIN — DOXYCYCLINE 100 MG: 100 INJECTION, POWDER, LYOPHILIZED, FOR SOLUTION INTRAVENOUS at 09:39

## 2021-01-01 RX ADMIN — Medication 10 ML: at 14:14

## 2021-01-01 RX ADMIN — INSULIN LISPRO 3 UNITS: 100 INJECTION, SOLUTION INTRAVENOUS; SUBCUTANEOUS at 12:33

## 2021-01-01 RX ADMIN — BUMETANIDE 1 MG: 0.25 INJECTION INTRAMUSCULAR; INTRAVENOUS at 08:37

## 2021-01-01 RX ADMIN — Medication 10 ML: at 22:37

## 2021-01-01 RX ADMIN — INSULIN GLARGINE 25 UNITS: 100 INJECTION, SOLUTION SUBCUTANEOUS at 08:37

## 2021-11-29 PROBLEM — J96.01 ACUTE RESPIRATORY FAILURE WITH HYPOXIA (HCC): Status: ACTIVE | Noted: 2021-01-01

## 2021-11-29 NOTE — H&P
[facility]    ICU TEAM H&P    Name: Ghazal Pierson   : 1955   MRN: [id number]   Date: 2021      Reason for ICU Admission: Transferred from outside facility and respiratory failure on mechanical ventilation    HPI: History and information obtained from medical record. Patient not able to provide information. No family available at this time. No previous medical in our system  This 61-year-old gentleman with past medical history of diabetes mellitus type 2, hypertension and tobacco use who presented to outside facility in the morning on  as he was developing progressive shortness of breath and chest discomfort. Apparently for the last few days he is having progressive dyspnea as described in his chart. In the outside facility they found to be hypoxic with bilateral infiltrate, COVID-19 was ruled out and he was placed on BiPAP but unfortunately could not tolerated and had to be intubated. Further work-up showed DKA as well as elevated BNP and troponin. He was started on insulin infusion. DKA protocol, reportedly EKG there showed ST segment elevation in aVR and depression in lateral leads. There was no cardiology service in the other hospital but repeated troponin dropped to 0.3 from 1.2 after intubation. Patient was given cefepime and vancomycin and blood culture was obtained as well. He was transferred here for further evaluation and higher level of care. Patient arrived to the ICU sedated intubated mechanical ventilation. He is on propofol fentanyl and Levophed.  :       Active Problem List:     Problem List  Never Reviewed          Codes Class    Acute respiratory failure with hypoxia (HCC) ICD-10-CM: J96.01  ICD-9-CM: 518.81               Past Medical History:      has no past medical history on file. Past Surgical History:      has no past surgical history on file.     Home Medications:     Prior to Admission medications    Not on File Allergies/Social/Family History:     Not on File   Social History     Tobacco Use    Smoking status: Not on file    Smokeless tobacco: Not on file   Substance Use Topics    Alcohol use: Not on file      No family history on file. Review of Systems:     Not able to obtain due to patient medical condition    Objective:   Vital Signs:  Visit Vitals  Ht 5' 8\" (1.727 m)   Wt 87.2 kg (192 lb 3.9 oz)   BMI 29.23 kg/m²        No data recorded. Intake/Output:   No intake or output data in the 24 hours ending 11/29/21 1709    Physical Exam:    General:   Sedated intubated mechanical ventilation   Eyes:  Sclera anicteric. Pupils equally round and reactive to light. Mouth/Throat: Mucous membranes normal, ET tube in place   Neck: Supple   Lungs:   Clear to auscultation bilaterally, good effort   CV:  Regular rate and rhythm,no murmur, click, rub or gallop   Abdomen:   Soft, non-tender.   Obese, bowel sounds normal. non-distended   Extremities: No cyanosis or edema   Skin: Skin color, texture, turgor normal. no acute rash or lesions   Lymph nodes: Cervical and supraclavicular normal   Musculoskeletal: No swelling or deformity   Lines/Devices:  Intact, no erythema, drainage or tenderness   Neuro:  Sedated, moves 4 limbs to painful stimuli       LABS AND  DATA: Personally reviewed  Pro B NP: 1816 D-dimer 0.67 troponin at 0140 was 1.54 went down to 0.33 at 0958  Blood gas at 0404 pH 7.24 PCO2 35 PO2 71 on BiPAP 100%  Chemistry at 10 AM sodium 134 potassium 5.9 chloride 103 CO2 15 BUN 30 creatinine 1.5 glucose 340 anion gap 16  Lactic acid trended down from 5.4-3.9 at 10 AM  Triglyceride 612 at 7 AM    Beta hydroxybutyrate elevated at 1.1  CBC: WBC 16.8 hemoglobin 14.5 platelet 402    Hemodynamics:   PAP:   CO:     Wedge:   CI:     CVP:    SVR:       PVR:       Ventilator Settings:  Mode Rate Tidal Volume Pressure FiO2 PEEP                    Peak airway pressure:      Minute ventilation:          MEDS: Reviewed    Chest X-Ray:  CXR Results  (Last 48 hours)    None        Reported from outside facility to have bilateral infiltrate pending our chest x-ray    ECHO:  Ordered        Assessment and Plan:   Septic shock:  Acute hypoxic respiratory failure  Underlying etiology is not clear, reported x-ray stated that he had bilateral infiltrate, reported Covid testing was negative. His BNP is elevated as well as his WBC. He may had a multifactorial issue in form of pneumonia, pulmonary edema or perhaps even viral pneumonitis. Complete work-up has been ordered including chest x-ray EKG troponin and respiratory viral panel as well as blood culture. Optimize mechanical ventilation, obtain ABG and adjust as needed with protective ventilatory strategy  Continue cefepime and vancomycin  Further plans when more information is available and lab results and work-up are back  DKA  Continue with IV insulin and IV fluid. Will adjust based on lab result  Acute kidney injury  Monitor urine output, continue with IV fluid, monitor electrolyte frequently and replace as needed. Hyperkalemia  Pending lab result, might be related to acidosis and DKA. Monitor closely. Pending EKG  Elevated troponin [non-STEMI versus type II MI]  Reportedly troponin level been decreasing. However the report of the EKG [the original study is not available to be reviewed] stated that he had ST segment elevation in aVR and depression and lateral leads. On repeat troponin and EKG here. I ordered an echo. If needed will consult cardiology. Start aspirin. Reportedly patient is allergic to statin  Hypertriglyceridemia:  Repeat lipid panel in the morning. May improve with insulin therapy. Addendum: At 1805  Troponin came back in our system at 10,000, repeated EKG in our system showing ST segment changes in lateral leads. I do not have old EKG to compare to.   Reviewing has transfer chart it seems he received therapeutic Lovenox in the referring hospital.  I believe it is reasonable to consult cardiology. We will go ahead and place a consult to them and I will talk to the cardiologist  Chest x-ray showed adequate positioning of the ET tube, bilateral fluffy infiltrate more consistent with edema in my opinion. ABG showed acidosis both metabolic and respiratory, increase the tidal volume and rate. ABG also showed significant hypoxia giving his FiO2 and high PEEP  Chemistry results still pending    Addendum at 1827:  Lab came back showing closed anion gap. Discussed with cardiologist over the phone. Stop insulin drip, changed to NS fluid, give Lantus, replace magnesium. Based on his clinical course we may need to start diuretics as I am suspicious his bilateral infiltrates are present edema other than infectious process however pending further work-up and as I mentioned before clinical course. Start heparin drip. Repeat EKG if clinical condition changed. Serial troponin. DVT and GI prophylaxis, ventilator bundle. At this time I do not have the information to contact his family. We will find this information in the chart and did attempt to contact them when possible. CRITICAL CARE CONSULTANT NOTE  I had a face to face encounter with the patient, reviewed and interpreted patient data including clinical events, labs, images, vital signs, I/O's, and examined patient. I have discussed the case and the plan and management of the patient's care with the consulting services, the bedside nurses and the respiratory therapist.      NOTE OF PERSONAL INVOLVEMENT IN CARE   This patient has a high probability of imminent, clinically significant deterioration, which requires the highest level of preparedness to intervene urgently.  I participated in the decision-making and personally managed or directed the management of the following life and organ supporting interventions that required my frequent assessment to treat or prevent imminent deterioration. I personally spent 75 minutes of critical care time. This is time spent at this critically ill patient's bedside actively involved in patient care as well as the coordination of care and discussions with the patient's family. This does not include any procedural time which has been billed separately. Christian Hernandez M.D.   Staff Intensivist/Pulmonologist  [facility]  11/29/2021

## 2021-11-30 NOTE — PROGRESS NOTES
2000 - Report received from [facility]', RN. VSS, pt responds to pain. Intensivist at bedside, orders received. Repeat respiratory culture & MRSA swab sent. 2200 - MAPs 80s, Levophed weaned. CHG bath given. Pt tolerated fair. 18 - Cardiology at bedside, update given, in agreement w/ intensivist plan of care, no new orders at this time. Plan for cardiac cath when pt more stable. 0000 - Labs drawn. ABG drawn by RT. Vent adjusted per Intensivist. IV lasix given. 0400 - AM labs drawn. AM CXR completed. 0500 - Spoke w/ wife, update given. Bedside and Verbal shift change report given to Genet (oncoming nurse) by Hurley Boxer (offgoing nurse). Report included the following information SBAR, Kardex, Intake/Output, MAR, Accordion, Recent Results, Cardiac Rhythm -NSR/ST and Alarm Parameters .

## 2021-11-30 NOTE — PROGRESS NOTES
Cardiology Progress Note            Admit Date: 11/29/2021  Admit Diagnosis: Acute respiratory failure with hypoxia ([facility] [address] [J96.01]  Date: 11/30/2021     Time: 1:43 PM    HPI: Chris Patricia is a 73 y.o. male admitted for Acute respiratory failure with hypoxia ([facility] [address] [J96.01]. Pt transferred from OSH with respiratory failure, DM2, HTN, tobacco use. Troponin elevated here c/w NSTEMI. No mention of chest pain, but progressive shortness of breath. Also with DKA. Initially On pressor support levophed but now has been weaned off overnight. . No report of prior cardiac history. Today, He remains intubated and sedated. He is mildly tachycardic and SBP is 90's-low 100's off of vasopressor (levophed) since 2AM.  Troponin has trended down to 6378 from peak of 10, 757. He is still requiring high ventilatory setting this a.m. Assessment and Plan     1. NSTEMI   -Troponin trending down (now 6378)   -EKG, nonspecific ST/T wave changes   -continue ACS heparin   -history of statin intolerance/allergy per records   -Tentatively plan LHC planned Thursday (allow additional diuresis and improvement in pulmonary status)    2. Cardiomyopathy, acute systolic CHF   -EF 42-45%, mild MR, moderately dilated LV   -NYHA IV on admission   -will need LHC later this week once stabilized. -Received lasix and bumex today. Will continue bumex 1 mg IV BID   -Low-low NL BP limits GDMT    3. Hypotension/shock   -suspect cardiogenic, may also septic?   -required IV vasopressors, now off vasopressors. 4. Acute hypoxic respiratory failure:   -Likely due to  pulmonary edema, ?pneumonia   -on vent, sedated. 5. DKA   -A1C=9   -glucose improved. 6. Elevated creatinine   -creatinine trending down (1.37). -uncertain baseline     7. Dyslipidemia/hypertriglyceridemia   -HDL 34, triglycerides 265, LDL 66   -no statin given statin allergy and transaminitis  8.  Elevated LFTs   -monitor. Saw and evaluated pt and agree with above assessment and plan. Echo findings as noted above and initial presentation at least partly cardiogenic shock. Continue IV diuresis. Tentative plan for [facility] on Thursday. Wei Parmar MD    Cardiac testin21    ECHO ADULT COMPLETE 2021    Interpretation Summary  · LV: Severely and globally reduced systolic function. Estimated LVEF is 20 - 25%. Moderately dilated left ventricle. · MV: Mild mitral valve regurgitation is present. Signed by: Solitario Zelaya MD on 2021 11:37 AM          PMH  DM  No past medical history on file. Social Hx  Social History     Socioeconomic History    Marital status:      Spouse name: Not on file    Number of children: Not on file    Years of education: Not on file    Highest education level: Not on file   Occupational History    Not on file   Tobacco Use    Smoking status: Not on file    Smokeless tobacco: Not on file   Substance and Sexual Activity    Alcohol use: Not on file    Drug use: Not on file    Sexual activity: Not on file   Other Topics Concern    Not on file   Social History Narrative    Not on file     Social Determinants of Health     Financial Resource Strain:     Difficulty of Paying Living Expenses: Not on file   Food Insecurity:     Worried About Running Out of Food in the Last Year: Not on file    [place] of Food in the Last Year: Not on file   Transportation Needs:     Lack of Transportation (Medical): Not on file    Lack of Transportation (Non-Medical):  Not on file   Physical Activity:     Days of Exercise per Week: Not on file    Minutes of Exercise per Session: Not on file   Stress:     Feeling of Stress : Not on file   Social Connections:     Frequency of Communication with Friends and Family: Not on file    Frequency of Social Gatherings with Friends and Family: Not on file    Attends Restorationist Services: Not on file   [employer] of Clubs or Organizations: Not on file    Attends Club or Organization Meetings: Not on file    Marital Status: Not on file   Intimate Partner Violence:     Fear of Current or Ex-Partner: Not on file    Emotionally Abused: Not on file    Physically Abused: Not on file    Sexually Abused: Not on file   Housing Stability:     Unable to Pay for Housing in the Last Year: Not on file    Number of [place] in the Last Year: Not on file    Unstable Housing in the Last Year: Not on file       ROS:  Unable to obtain due to patient factors. Objective:      Physical Exam:                Visit Vitals  /74   Pulse (!) 104   Temp 100.4 °F (38 °C)   Resp 16   Ht 5' 8\" (1.727 m)   Wt 191 lb 12.8 oz (87 kg)   SpO2 98%   BMI 29.16 kg/m²          General Appearance:   Well developed, sedated. Mechanically ventilated. .   Ears/Nose/Mouth/Throat:    Oral ET tube in place. Neck:  Supple. Chest:    Lungs with few rhonci to auscultation bilaterally. Cardiovascular:   Regular rate and rhythm, S1, S2 normal, no murmur. Abdomen:    Soft, non-distended bowel sounds are active. Extremities:  No edema bilaterally. Skin:  Warm and dry. Telemetry: sinus tachycardia.            Data Review:    Labs:    Recent Results (from the past 24 hour(s))   METABOLIC PANEL, BASIC    Collection Time: 11/29/21  5:04 PM   Result Value Ref Range    Sodium 134 (L) 136 - 145 mmol/L    Potassium 5.0 3.5 - 5.1 mmol/L    Chloride 108 97 - 108 mmol/L    CO2 18 (L) 21 - 32 mmol/L    Anion gap 8 5 - 15 mmol/L    Glucose 247 (H) 65 - 100 mg/dL    BUN 34 (H) 6 - 20 MG/DL    Creatinine 1.92 (H) 0.70 - 1.30 MG/DL    BUN/Creatinine ratio 18 12 - 20      GFR est AA 43 (L) >60 ml/min/1.73m2    GFR est non-AA 35 (L) >60 ml/min/1.73m2    Calcium 7.6 (L) 8.5 - 10.1 MG/DL   MAGNESIUM    Collection Time: 11/29/21  5:04 PM   Result Value Ref Range    Magnesium 1.9 1.6 - 2.4 mg/dL   CBC WITH AUTOMATED DIFF    Collection Time: 11/29/21  5:08 PM Result Value Ref Range    WBC 14.2 (H) 4.1 - 11.1 K/uL    RBC 4.14 4.10 - 5.70 M/uL    HGB 12.8 12.1 - 17.0 g/dL    HCT 38.4 36.6 - 50.3 %    MCV 92.8 80.0 - 99.0 FL    MCH 30.9 26.0 - 34.0 PG    MCHC 33.3 30.0 - 36.5 g/dL    RDW 11.9 11.5 - 14.5 %    PLATELET 041 684 - 890 K/uL    MPV 8.6 (L) 8.9 - 12.9 FL    NRBC 0.0 0  WBC    ABSOLUTE NRBC 0.00 0.00 - 0.01 K/uL    NEUTROPHILS 67 32 - 75 %    LYMPHOCYTES 21 12 - 49 %    MONOCYTES 11 5 - 13 %    EOSINOPHILS 0 0 - 7 %    BASOPHILS 0 0 - 1 %    IMMATURE GRANULOCYTES 1 (H) 0.0 - 0.5 %    ABS. NEUTROPHILS 9.6 (H) 1.8 - 8.0 K/UL    ABS. LYMPHOCYTES 3.0 0.8 - 3.5 K/UL    ABS. MONOCYTES 1.6 (H) 0.0 - 1.0 K/UL    ABS. EOSINOPHILS 0.0 0.0 - 0.4 K/UL    ABS. BASOPHILS 0.0 0.0 - 0.1 K/UL    ABS. IMM. GRANS. 0.1 (H) 0.00 - 0.04 K/UL    DF AUTOMATED     METABOLIC PANEL, COMPREHENSIVE    Collection Time: 11/29/21  5:08 PM   Result Value Ref Range    Sodium 134 (L) 136 - 145 mmol/L    Potassium 5.1 3.5 - 5.1 mmol/L    Chloride 108 97 - 108 mmol/L    CO2 20 (L) 21 - 32 mmol/L    Anion gap 6 5 - 15 mmol/L    Glucose 243 (H) 65 - 100 mg/dL    BUN 31 (H) 6 - 20 MG/DL    Creatinine 1.82 (H) 0.70 - 1.30 MG/DL    BUN/Creatinine ratio 17 12 - 20      GFR est AA 46 (L) >60 ml/min/1.73m2    GFR est non-AA 38 (L) >60 ml/min/1.73m2    Calcium 7.9 (L) 8.5 - 10.1 MG/DL    Bilirubin, total 0.4 0.2 - 1.0 MG/DL    ALT (SGPT) 1,057 (H) 12 - 78 U/L    AST (SGOT) 1,548 (H) 15 - 37 U/L    Alk.  phosphatase 56 45 - 117 U/L    Protein, total 6.8 6.4 - 8.2 g/dL    Albumin 3.2 (L) 3.5 - 5.0 g/dL    Globulin 3.6 2.0 - 4.0 g/dL    A-G Ratio 0.9 (L) 1.1 - 2.2     LACTIC ACID    Collection Time: 11/29/21  5:08 PM   Result Value Ref Range    Lactic acid 1.5 0.4 - 2.0 MMOL/L   MAGNESIUM    Collection Time: 11/29/21  5:08 PM   Result Value Ref Range    Magnesium 1.8 1.6 - 2.4 mg/dL   TROPONIN-HIGH SENSITIVITY    Collection Time: 11/29/21  5:08 PM   Result Value Ref Range    Troponin-High Sensitivity 10,757 (HH) 0 - 76 ng/L   NT-PRO BNP    Collection Time: 11/29/21  5:08 PM   Result Value Ref Range    NT pro-BNP 5,662 (H) <125 PG/ML   PHOSPHORUS    Collection Time: 11/29/21  5:08 PM   Result Value Ref Range    Phosphorus 2.7 2.6 - 4.7 MG/DL   HEMOGLOBIN A1C WITH EAG    Collection Time: 11/29/21  5:08 PM   Result Value Ref Range    Hemoglobin A1c 9.0 (H) 4.0 - 5.6 %    Est. average glucose 212 mg/dL   CULTURE, BLOOD, PAIRED    Collection Time: 11/29/21  5:08 PM    Specimen: Blood   Result Value Ref Range    Special Requests: NO SPECIAL REQUESTS      Culture result: NO GROWTH AFTER 19 HOURS     POC G3 - PUL    Collection Time: 11/29/21  5:23 PM   Result Value Ref Range    FIO2 (POC) 100 %    pH (POC) 7.24 (LL) 7.35 - 7.45      pCO2 (POC) 42.9 35.0 - 45.0 MMHG    pO2 (POC) 63 (L) 80 - 100 MMHG    HCO3 (POC) 18.3 (L) 22 - 26 MMOL/L    sO2 (POC) 87.3 (L) 92 - 97 %    Base deficit (POC) 8.8 mmol/L    Site LEFT RADIAL      Device: ADULT VENT      Mode ASSIST CONTROL      Tidal volume 420 ml    Set Rate 16 bpm    PEEP/CPAP (POC) 14 cmH2O    Allens test (POC) Positive      Specimen type (POC) ARTERIAL      Critical value read back SOHA LAUGHLIN.  RN    GLUCOSE, POC    Collection Time: 11/29/21  5:26 PM   Result Value Ref Range    Glucose (POC) 233 (H) 65 - 117 mg/dL    Performed by Mary Grace Heredia    Collection Time: 11/29/21  5:26 PM   Result Value Ref Range    Glucose 233 mg/dL    Insulin order 3.5 units/hour    Insulin adminstered 3.5 units/hour    Multiplier 0.020     Low target 150 mg/dL    High target 250 mg/dL    D50 order 0.0 ml    D50 administered 0.00 ml    Minutes until next BG 60 min    Order initials cs     Administered initials dl     GLSCOM Comments     EKG, 12 LEAD, INITIAL    Collection Time: 11/29/21  5:42 PM   Result Value Ref Range    Ventricular Rate 110 BPM    Atrial Rate 110 BPM    P-R Interval 146 ms    QRS Duration 122 ms    Q-T Interval 356 ms    QTC Calculation (Bezet) 481 ms Calculated P Axis 8 degrees    Calculated R Axis 13 degrees    Calculated T Axis 123 degrees    Diagnosis       Sinus tachycardia with occasional premature ventricular complexes and fusion   complexes  Left bundle branch block  No previous ECGs available     EKG, 12 LEAD, INITIAL    Collection Time: 11/29/21  5:58 PM   Result Value Ref Range    Ventricular Rate 103 BPM    Atrial Rate 103 BPM    P-R Interval 166 ms    QRS Duration 118 ms    Q-T Interval 368 ms    QTC Calculation (Bezet) 482 ms    Calculated P Axis 51 degrees    Calculated R Axis 42 degrees    Calculated T Axis 159 degrees    Diagnosis       Sinus tachycardia  Septal infarct , age undetermined  ST & T wave abnormality, consider lateral ischemia  When compared with ECG of 29-NOV-2021 17:42,  fusion complexes are no longer present  premature ventricular complexes are no longer present  Left bundle branch block is no longer present  Septal infarct is now present     PTT    Collection Time: 11/29/21  6:29 PM   Result Value Ref Range    aPTT 30.8 22.1 - 31.0 sec    aPTT, therapeutic range     58.0 - 77.0 SECS   RESPIRATORY VIRUS PANEL W/COVID-19, PCR    Collection Time: 11/29/21  8:39 PM    Specimen: Nasopharyngeal   Result Value Ref Range    Adenovirus Not detected NOTD      Coronavirus 229E Not detected NOTD      Coronavirus HKU1 Not detected NOTD      Coronavirus CVNL63 Not detected NOTD      Coronavirus OC43 Not detected NOTD      SARS-CoV-2, PCR Not detected NOTD      Metapneumovirus Not detected NOTD      Rhinovirus and Enterovirus Detected (A) NOTD      Influenza A Not detected NOTD      Influenza A, subtype H1 Not detected NOTD      Influenza A, subtype H3 Not detected NOTD      INFLUENZA A H1N1 PCR Not detected NOTD      Influenza B Not detected NOTD      Parainfluenza 1 Not detected NOTD      Parainfluenza 2 Not detected NOTD      Parainfluenza 3 Not detected NOTD      Parainfluenza virus 4 Not detected NOTD      RSV by PCR Not detected NOTD B. parapertussis, PCR Not detected NOTD      Bordetella pertussis - PCR Not detected NOTD      Chlamydophila pneumoniae DNA, QL, PCR Not detected NOTD      Mycoplasma pneumoniae DNA, QL, PCR Not detected NOTD     POC G3 - PUL    Collection Time: 11/30/21 12:22 AM   Result Value Ref Range    FIO2 (POC) 70 %    pH (POC) 7.30 (L) 7.35 - 7.45      pCO2 (POC) 34.7 (L) 35.0 - 45.0 MMHG    pO2 (POC) 104 (H) 80 - 100 MMHG    HCO3 (POC) 17.1 (L) 22 - 26 MMOL/L    sO2 (POC) 97.4 (H) 92 - 97 %    Base deficit (POC) 8.5 mmol/L    Site DRAWN FROM ARTERIAL LINE      Device: ADULT VENT      Mode ASSIST CONTROL      Tidal volume 450 ml    Set Rate 18 bpm    PEEP/CPAP (POC) 14 cmH2O    Allens test (POC) NOT APPLICABLE      Specimen type (POC) ARTERIAL     GLUCOSE, POC    Collection Time: 11/30/21 12:23 AM   Result Value Ref Range    Glucose (POC) 197 (H) 65 - 117 mg/dL    Performed by Tiffanie Rodriguez    TROPONIN-HIGH SENSITIVITY    Collection Time: 11/30/21 12:41 AM   Result Value Ref Range    Troponin-High Sensitivity 6,378 (HH) 0 - 76 ng/L   PTT    Collection Time: 11/30/21 12:41 AM   Result Value Ref Range    aPTT 34.5 (H) 22.1 - 31.0 sec    aPTT, therapeutic range     58.0 - 77.0 SECS   CBC WITH AUTOMATED DIFF    Collection Time: 11/30/21  4:01 AM   Result Value Ref Range    WBC 10.1 4.1 - 11.1 K/uL    RBC 3.72 (L) 4.10 - 5.70 M/uL    HGB 11.6 (L) 12.1 - 17.0 g/dL    HCT 34.3 (L) 36.6 - 50.3 %    MCV 92.2 80.0 - 99.0 FL    MCH 31.2 26.0 - 34.0 PG    MCHC 33.8 30.0 - 36.5 g/dL    RDW 12.2 11.5 - 14.5 %    PLATELET 077 425 - 708 K/uL    MPV 9.1 8.9 - 12.9 FL    NRBC 0.0 0  WBC    ABSOLUTE NRBC 0.00 0.00 - 0.01 K/uL    NEUTROPHILS 67 32 - 75 %    LYMPHOCYTES 20 12 - 49 %    MONOCYTES 10 5 - 13 %    EOSINOPHILS 1 0 - 7 %    BASOPHILS 1 0 - 1 %    IMMATURE GRANULOCYTES 1 (H) 0.0 - 0.5 %    ABS. NEUTROPHILS 7.0 1.8 - 8.0 K/UL    ABS. LYMPHOCYTES 2.0 0.8 - 3.5 K/UL    ABS. MONOCYTES 1.0 0.0 - 1.0 K/UL    ABS.  EOSINOPHILS 0.1 0.0 - 0.4 K/UL    ABS. BASOPHILS 0.1 0.0 - 0.1 K/UL    ABS. IMM. GRANS. 0.1 (H) 0.00 - 0.04 K/UL    DF AUTOMATED     METABOLIC PANEL, COMPREHENSIVE    Collection Time: 11/30/21  4:01 AM   Result Value Ref Range    Sodium 135 (L) 136 - 145 mmol/L    Potassium 4.3 3.5 - 5.1 mmol/L    Chloride 109 (H) 97 - 108 mmol/L    CO2 18 (L) 21 - 32 mmol/L    Anion gap 8 5 - 15 mmol/L    Glucose 188 (H) 65 - 100 mg/dL    BUN 28 (H) 6 - 20 MG/DL    Creatinine 1.37 (H) 0.70 - 1.30 MG/DL    BUN/Creatinine ratio 20 12 - 20      GFR est AA >60 >60 ml/min/1.73m2    GFR est non-AA 52 (L) >60 ml/min/1.73m2    Calcium 7.9 (L) 8.5 - 10.1 MG/DL    Bilirubin, total 0.5 0.2 - 1.0 MG/DL    ALT (SGPT) 955 (H) 12 - 78 U/L    AST (SGOT) 918 (H) 15 - 37 U/L    Alk.  phosphatase 53 45 - 117 U/L    Protein, total 6.3 (L) 6.4 - 8.2 g/dL    Albumin 3.0 (L) 3.5 - 5.0 g/dL    Globulin 3.3 2.0 - 4.0 g/dL    A-G Ratio 0.9 (L) 1.1 - 2.2     LACTIC ACID    Collection Time: 11/30/21  4:01 AM   Result Value Ref Range    Lactic acid 1.1 0.4 - 2.0 MMOL/L   MAGNESIUM    Collection Time: 11/30/21  4:01 AM   Result Value Ref Range    Magnesium 1.6 1.6 - 2.4 mg/dL   NT-PRO BNP    Collection Time: 11/30/21  4:01 AM   Result Value Ref Range    NT pro-BNP 3,385 (H) <125 PG/ML   LIPID PANEL    Collection Time: 11/30/21  4:01 AM   Result Value Ref Range    Cholesterol, total 153 <200 MG/DL    Triglyceride 265 (H) <150 MG/DL    HDL Cholesterol 34 MG/DL    LDL, calculated 66 0 - 100 MG/DL    VLDL, calculated 53 MG/DL    CHOL/HDL Ratio 4.5 0.0 - 5.0     GLUCOSE, POC    Collection Time: 11/30/21  4:05 AM   Result Value Ref Range    Glucose (POC) 190 (H) 65 - 117 mg/dL    Performed by Tiffanie Rodriguez    PTT    Collection Time: 11/30/21  8:14 AM   Result Value Ref Range    aPTT 44.8 (H) 22.1 - 31.0 sec    aPTT, therapeutic range     58.0 - 77.0 SECS   GLUCOSE, POC    Collection Time: 11/30/21  8:19 AM   Result Value Ref Range    Glucose (POC) 163 (H) 65 - 117 mg/dL Performed by Demetrius Villafana    ECHO ADULT COMPLETE    Collection Time: 11/30/21  9:15 AM   Result Value Ref Range    IVSd 1.11 (A) 0.6 - 1.0 cm    LVIDd 5.05 4.2 - 5.9 cm    LVIDs 4.34 cm    LVPWd 0.95 0.6 - 1.0 cm    BP EF 32.7 (A) 55 - 100 percent    LV Ejection Fraction MOD 2C 38 percent    LV Ejection Fraction MOD 4C 25 percent    LV ED Vol A2C 154.78 mL    LV ED Vol A4C 128.40 mL    LV ED Vol .91 67 - 155 mL    LV ES Vol A2C 95.61 mL    LV ES Vol A4C 95.92 mL    LV ES Vol BP 96.80 (A) 22 - 58 mL    LVOT Peak Gradient 1.25 mmHg    LVOT Peak Velocity 55.87 cm/s    Left Atrium Major Axis 3.16 cm    AoV PG 4.50 mmHg    Aortic Valve Systolic Peak Velocity 162.05 cm/s    MV A Avel 47.61 cm/s    Mitral Valve E Wave Deceleration Time 81.47 ms    MV E Avel 64.30 cm/s    E/E' lateral 12.46     E/E' septal 14.92     LV E' Lateral Velocity 5.16 cm/s    LV E' Septal Velocity 4.31 cm/s    Mitral Valve Pressure Half-time 23.63 ms    MVA (PHT) 9.31 cm2    Pulmonic Valve Systolic Peak Instantaneous Gradient 2.39 mmHg    Pulmonic Valve Max Velocity 77.35 cm/s    Tapse 1.58 1.5 - 2.0 cm    Ao Root D 3.01 cm    MV E/A 1.35     LV Mass .5 88 - 224 g    LV Mass AL Index 95.8 49 - 115 g/m2    E/E' ratio (averaged) 13.69     LVES Vol Index BP 48.2 mL/m2    LVED Vol Index BP 71.6 mL/m2    Left Atrium Minor Axis 1.57 cm    LVED Vol Index A4C 63.9 mL/m2    LVED Vol Index A2C 77.0 mL/m2    LVES Vol Index A4C 47.7 mL/m2    LVES Vol Index A2C 47.6 mL/m2   GLUCOSE, POC    Collection Time: 11/30/21 12:13 PM   Result Value Ref Range    Glucose (POC) 160 (H) 65 - 117 mg/dL    Performed by Webflow           Radiology:        Current Facility-Administered Medications   Medication Dose Route Frequency    bumetanide (BUMEX) injection 1 mg  1 mg IntraVENous BID    sodium chloride (NS) flush 5-40 mL  5-40 mL IntraVENous Q8H    sodium chloride (NS) flush 5-40 mL  5-40 mL IntraVENous PRN    acetaminophen (TYLENOL) tablet 650 mg  650 mg Oral Q6H PRN    Or    acetaminophen (TYLENOL) suppository 650 mg  650 mg Rectal Q6H PRN    polyethylene glycol (MIRALAX) packet 17 g  17 g Oral DAILY PRN    ondansetron (ZOFRAN ODT) tablet 4 mg  4 mg Oral Q8H PRN    Or    ondansetron (ZOFRAN) injection 4 mg  4 mg IntraVENous Q6H PRN    famotidine (PEPCID) tablet 20 mg  20 mg Oral Q12H    chlorhexidine (ORAL CARE KIT) 0.12 % mouthwash 15 mL  15 mL Oral Q12H    cefepime (MAXIPIME) 2 g in 0.9% sodium chloride 10 mL IV syringe  2 g IntraVENous Q12H    propofol (DIPRIVAN) 10 mg/mL infusion  0-50 mcg/kg/min IntraVENous TITRATE    NOREPINephrine (LEVOPHED) 8 mg in 5% dextrose 250mL (32 mcg/mL) infusion  0.5-30 mcg/min IntraVENous TITRATE    fentaNYL (PF) 1,500 mcg/30 mL (50 mcg/mL) infusion  0-200 mcg/hr IntraVENous TITRATE    heparin 25,000 units in D5W 250 ml infusion  11-25 Units/kg/hr IntraVENous TITRATE    glucose chewable tablet 16 g  4 Tablet Oral PRN    dextrose (D50W) injection syrg 12.5-25 g  25-50 mL IntraVENous PRN    glucagon (GLUCAGEN) injection 1 mg  1 mg IntraMUSCular PRN    insulin glargine (LANTUS) injection 17 Units  0.2 Units/kg SubCUTAneous DAILY    insulin lispro (HUMALOG) injection   SubCUTAneous Q4H    Vancomycin - pharmacy to dose   Other Rx Dosing/Monitoring    Vanocmycin - random level 11/30 @ 1800   Other ONCE          Jeanette Valle.  WAYNE Begum     [facility], [address] [place] [address] [address]   [facility], [address]   [phone]

## 2021-11-30 NOTE — PROGRESS NOTES
0730 Bedside and Verbal shift change report given to Genet RN (oncoming nurse) by Angela Sevilla, NELIA (offgoing nurse). Report included the following information SBAR, Kardex, Procedure Summary, Intake/Output, MAR, Recent Results and Cardiac Rhythm ST.   0815 PTT sent  1100 PTT 44.8 - heparin bolus & gtt increased by 2 per protocol - will recheck at 1700  1130 Dr. Olya Jensen at bedside - vent settings changed - rate 14 FiO2 50%  1200 Bedside and Verbal shift change report given to Kim RN (oncoming nurse) by Christopher Fine RN (offgoing nurse).  Report included the following information SBAR, Kardex, Intake/Output, MAR, Recent Results and Cardiac Rhythm ST.

## 2021-11-30 NOTE — PROGRESS NOTES
Pharmacist Note - Vancomycin Dosing  Therapy day 2  Indication: Sepsis  Current regimen: dosing by levels    Recent Labs     11/30/21  0401 11/29/21  1708 11/29/21  1704   WBC 10.1 14.2*  --    CREA 1.37* 1.82* 1.92*   BUN 28* 31* 34*       A random vancomycin level of 7.9 mcg/mL was obtained. Goal target range Trough 10-15 mcg/mL      Plan: Will order 1500 mg x 1 dose. Pharmacy will continue to monitor this patient daily for changes in clinical status and renal function.

## 2021-11-30 NOTE — PROGRESS NOTES
[facility]    ICU TEAM Progress Note    Name: Theresa Haeton   : 1955   MRN: [id number]   Date: 2021      I  Subjective:   Progress Note: 2021      Reason for ICU Admission: Transferred from outside facility and respiratory failure, on mechanical ventilation    Interval history: From [facility] on  73year-old gentleman with past medical history of diabetes mellitus type 2, hypertension and tobacco use who presented to outside facility in the morning on  as he was developing progressive shortness of breath and chest discomfort. Apparently for the last few days he is having progressive dyspnea as described in his chart. In the outside facility they found to be hypoxic with bilateral infiltrate, COVID-19 was ruled out and he was placed on BiPAP but unfortunately could not tolerated and had to be intubated. Further work-up showed DKA as well as elevated BNP and troponin. He was started on insulin infusion. DKA protocol, reportedly EKG there showed ST segment elevation in aVR and depression in lateral leads. There was no cardiology service in the other hospital but repeated troponin dropped to 0.3 from 1.2 after intubation. Patient was given cefepime and vancomycin and blood culture was obtained as well. Overnight Events:   : Some improvement, DKA resolved, off pressors, still requiring high ventilatory setting but able to wean. Received 1 dose of diuretics with good results. Troponin started to trend down, kidney function improving. Active Problem List:     Problem List  Never Reviewed          Codes Class    Acute respiratory failure with hypoxia (HCC) ICD-10-CM: J96.01  ICD-9-CM: 518.81               Past Medical History:      has no past medical history on file. Past Surgical History:      has no past surgical history on file. Home Medications:     Prior to Admission medications    Medication Sig Start Date End Date Taking?  Authorizing Provider levothyroxine (SYNTHROID) 137 mcg tablet Take 137 mcg by mouth Daily (before breakfast). Yes Provider, Historical   glipiZIDE (GLUCOTROL) 10 mg tablet Take 10 mg by mouth daily. Yes Provider, Historical   lisinopril-hydroCHLOROthiazide (PRINZIDE, ZESTORETIC) 20-12.5 mg per tablet Take 1 Tablet by mouth daily. Yes Provider, Historical   metFORMIN (GLUCOPHAGE) 1,000 mg tablet Take 1,000 mg by mouth two (2) times daily (with meals). Yes Provider, Historical   amLODIPine (NORVASC) 5 mg tablet Take 5 mg by mouth daily. Yes Provider, Historical   gabapentin (NEURONTIN) 300 mg capsule Take 300 mg by mouth three (3) times daily. Yes Provider, Historical   ibuprofen (MOTRIN) 800 mg tablet Take 800 mg by mouth every eight (8) hours as needed for Pain. Yes Provider, Historical   cholecalciferol (Vitamin D3) (1000 Units /25 mcg) tablet Take  by mouth daily. Yes Provider, Historical   ascorbic acid, vitamin C, (Vitamin C) 250 mg tablet Take  by mouth. Yes Provider, Historical   cyanocobalamin (Vitamin B-12) 100 mcg tablet Take 100 mcg by mouth daily. Yes Provider, Historical   cinnamon bark (Cinnamon) 500 mg cap Take  by mouth. Yes Provider, Historical   omega 3-dha-epa-fish oil (Fish OiL) 100-160-1,000 mg cap Take  by mouth. Yes Provider, Historical   vitamin e (E GEMS) 100 unit capsule Take  by mouth daily. Yes Provider, Historical       Allergies/Social/Family History: Allergies   Allergen Reactions    Statins-Hmg-Coa Reductase Inhibitors Other (comments)     Per report from [facility]      Social History     Tobacco Use    Smoking status: Not on file    Smokeless tobacco: Not on file   Substance Use Topics    Alcohol use: Not on file      No family history on file.     Review of Systems:     Not able to obtain due to patient medical condition    Objective:   Vital Signs:  Visit Vitals  BP 94/65   Pulse (!) 102   Temp 99.9 °F (37.7 °C)   Resp 18   Ht 5' 8\" (1.727 m)   Wt 87.8 kg (193 lb 9 oz) SpO2 99%   BMI 29.43 kg/m²      O2 Device: Endotracheal tube, Ventilator Temp (24hrs), Av.6 °F (37.6 °C), Min:98.4 °F (36.9 °C), Max:100 °F (37.8 °C)           Intake/Output:     Intake/Output Summary (Last 24 hours) at 2021 0739  Last data filed at 2021 0700  Gross per 24 hour   Intake 2028.08 ml   Output 1400 ml   Net 628.08 ml       Physical Exam:    General:   Sedated intubated mechanical ventilation   Eyes:  Sclera anicteric. Pupils equally round and reactive to light. Mouth/Throat: Mucous membranes normal, ET tube in place   Neck: Supple   Lungs:   Clear to auscultation bilaterally, good effort   CV:  Regular rate and rhythm,no murmur, click, rub or gallop   Abdomen:   Soft, non-tender. Obese, bowel sounds normal. non-distended   Extremities: No cyanosis or edema   Skin: Skin color, texture, turgor normal. no acute rash or lesions   Lymph nodes: Cervical and supraclavicular normal   Musculoskeletal: No swelling or deformity   Lines/Devices:  Intact, no erythema, drainage or tenderness   Neuro:  Sedated, moves 4 limbs to painful stimuli         LABS AND  DATA: Personally reviewed  Recent Labs     21  0401 21  1708   WBC 10.1 14.2*   HGB 11.6* 12.8   HCT 34.3* 38.4    271     Recent Labs     21  0401 21  1708   * 134*   K 4.3 5.1   * 108   CO2 18* 20*   BUN 28* 31*   CREA 1.37* 1.82*   * 243*   CA 7.9* 7.9*   MG 1.6 1.8   PHOS  --  2.7     Recent Labs     21  0401 21  1708   AP 53 56   TP 6.3* 6.8   ALB 3.0* 3.2*   GLOB 3.3 3.6     Recent Labs     21  0041 21  1829   APTT 34.5* 30.8      Recent Labs     21  0022 21  1723   PHI 7.30* 7.24*   PCO2I 34.7* 42.9   PO2I 104* 63*   FIO2I 70 100     No results for input(s): CPK, CKMB, TROIQ, BNPP in the last 72 hours.     Hemodynamics:   PAP:   CO:     Wedge:   CI:     CVP:    SVR:       PVR:       Ventilator Settings:  Mode Rate Tidal Volume Pressure FiO2 PEEP Assist control   450 ml    70 % 14 cm H20     Peak airway pressure: 28 cm H2O    Minute ventilation: 8.51 l/min        MEDS: Reviewed    Chest X-Ray:  CXR Results  (Last 48 hours)               11/29/21 1732  XR CHEST PORT Final result    Impression:  Bilateral hilar pneumonia       Narrative:  EXAM: XR CHEST PORT       INDICATION: respiratory failure       COMPARISON: None. FINDINGS: A portable AP radiograph of the chest was obtained at 1728 hours. The   patient is on a cardiac monitor. The endotracheal tube terminates retirement   between the thoracic inlet and darnell. The NG tube extends below the   hemidiaphragm. The right IJ line terminates at the cavoatrial junction. There is   bilateral hilar airspace disease, asymmetric to the right The cardiac and   mediastinal contours and pulmonary vascularity are stable. The bones and soft   tissues are grossly within normal limits. ECHO:  Pending      Assessment and Plan:   Septic shock: Resolved. No clear source of infection yet. Acute hypoxic respiratory failure  Clinical course more consistent with pulmonary edema rather than infectious process. Stop IV fluid, another dose of Bumex. Monitor clinically  Respiratory panel negative but for rhinovirus, culture remain negative thus far, pending Legionella  It is reasonable to continue broad-spectrum antibiotic for now till finalizing of his culture and clearer  clinical picture  Wean mechanical ventilation as tolerated  DKA  Resolved, started on Lantus and sliding scale  Acute kidney injury  Improved. Continue to monitor. As mentioned above 1 dose of diuretics today. Hyperkalemia  Improved  Elevated troponin [non-STEMI versus type II MI]  Appreciate cardiology, troponin started trending down. On heparin drip and aspirin.   Reportedly patient is allergic to statin  Hypertriglyceridemia:  Improved on this morning lipid profile  Elevated transaminases:  Improving, continue to monitor    DISPOSITION  Stay in ICU    CRITICAL CARE CONSULTANT NOTE  I had a face to face encounter with the patient, reviewed and interpreted patient data including clinical events, labs, images, vital signs, I/O's, and examined patient. I have discussed the case and the plan and management of the patient's care with the consulting services, the bedside nurses and the respiratory therapist.      NOTE OF PERSONAL INVOLVEMENT IN CARE   This patient has a high probability of imminent, clinically significant deterioration, which requires the highest level of preparedness to intervene urgently. I participated in the decision-making and personally managed or directed the management of the following life and organ supporting interventions that required my frequent assessment to treat or prevent imminent deterioration. I personally spent 40 minutes of critical care time. This is time spent at this critically ill patient's bedside actively involved in patient care as well as the coordination of care and discussions with the patient's family. This does not include any procedural time which has been billed separately. Jimenez Starks M.D.   Staff Intensivist/Pulmonologist  [facility]  11/30/2021

## 2021-11-30 NOTE — PROGRESS NOTES
1200 Bedside and Verbal shift change report given to Cat RN (oncoming nurse) by Christopher Fine RN (offgoing nurse). Report included the following information SBAR.     1215 PRN tylenol given for temp 38    1500 Olya Jensen MD at bedside. Peep decreased to 10    1700 Labs drawn    1900 TF started per order     1930 Bedside and Verbal shift change report given to Angela Sevilla RN (oncoming nurse) by Cat RN (offgoing nurse). Report included the following information SBAR.

## 2021-11-30 NOTE — PROGRESS NOTES
Pharmacist Note - Vancomycin Dosing    Consult provided for this 73 y.o. male for indication of sepsis. Antibiotic regimen(s): Vanc + Cefepime  Patient on vancomycin PTA? YES   -vancomycin 1250 mg given  @ 0638 at [facility] (per [facility])    Recent Labs     21  1708 21  1704   WBC 14.2*  --    CREA 1.82* 1.92*   BUN 31* 34*     Frequency of BMP: daily x 3  Height: 172.7 cm  Weight: 87.2 kg  Est CrCl: 43 ml/min; UO: -- ml/kg/hr  Temp (24hrs), Av.4 °F (36.9 °C), Min:98.4 °F (36.9 °C), Max:98.4 °F (36.9 °C)    Cultures:   blood - pending    MRSA Swab ordered (if applicable)? YES    The plan below is expected to result in a target range of trough 10-15 mcg/mL    Ordered a loading dose of 2250 mg given @ 1754. Upon further review, patient did receive a dose of vancomycin prior to transfer (1250 mg @ [phone]). Have asked RN to stop the infusing dose (has about half hour left). Will check a random level tomorrow prior to re-dosing given patients MARYURI and having already received 2 doses.

## 2021-11-30 NOTE — CONSULTS
Cardiology  Initial visit    Patient: Blaine Baker MRN: [id number]  SSN: [id number]    YOB: 1955  Age: 73 y.o. Sex: male       Subjective:      Date of  Admission: 11/29/2021     Admission type: Urgent    Blaine Baker is a 73 y.o. male admitted for Acute respiratory failure with hypoxia ([facility] [address] [J96.01]. Reason: NSTEMI  Pt transferred from OSH with respiratory failure, DM2, HTN, tobacco use. Troponin elevated here c/w NSTEMI. No mention of chest pain, but progressive shortness of breath. Also with DKA. On pressor support levo being weaned. No report of prior cardiac history. Primary Care Provider: Unknown, Provider, MD  No past medical history on file. No past surgical history on file. No family history on file.    Social History     Tobacco Use    Smoking status: Not on file    Smokeless tobacco: Not on file   Substance Use Topics    Alcohol use: Not on file      Current Facility-Administered Medications   Medication Dose Route Frequency    sodium chloride (NS) flush 5-40 mL  5-40 mL IntraVENous Q8H    sodium chloride (NS) flush 5-40 mL  5-40 mL IntraVENous PRN    acetaminophen (TYLENOL) tablet 650 mg  650 mg Oral Q6H PRN    Or    acetaminophen (TYLENOL) suppository 650 mg  650 mg Rectal Q6H PRN    polyethylene glycol (MIRALAX) packet 17 g  17 g Oral DAILY PRN    ondansetron (ZOFRAN ODT) tablet 4 mg  4 mg Oral Q8H PRN    Or    ondansetron (ZOFRAN) injection 4 mg  4 mg IntraVENous Q6H PRN    famotidine (PEPCID) tablet 20 mg  20 mg Oral Q12H    chlorhexidine (ORAL CARE KIT) 0.12 % mouthwash 15 mL  15 mL Oral Q12H    cefepime (MAXIPIME) 2 g in 0.9% sodium chloride 10 mL IV syringe  2 g IntraVENous Q12H    propofol (DIPRIVAN) 10 mg/mL infusion  0-50 mcg/kg/min IntraVENous TITRATE    NOREPINephrine (LEVOPHED) 8 mg in 5% dextrose 250mL (32 mcg/mL) infusion  0.5-30 mcg/min IntraVENous TITRATE    fentaNYL (PF) 1,500 mcg/30 mL (50 mcg/mL) infusion  0-200 mcg/hr IntraVENous TITRATE    0.9% sodium chloride infusion  75 mL/hr IntraVENous CONTINUOUS    heparin 25,000 units in D5W 250 ml infusion  11-25 Units/kg/hr IntraVENous TITRATE    glucose chewable tablet 16 g  4 Tablet Oral PRN    dextrose (D50W) injection syrg 12.5-25 g  25-50 mL IntraVENous PRN    glucagon (GLUCAGEN) injection 1 mg  1 mg IntraMUSCular PRN    insulin glargine (LANTUS) injection 17 Units  0.2 Units/kg SubCUTAneous DAILY    insulin lispro (HUMALOG) injection   SubCUTAneous Q4H    Vancomycin - pharmacy to dose   Other Rx Dosing/Monitoring    [START ON 11/30/2021] Vanocmycin - random level 11/30 @ 1800   Other ONCE    furosemide (LASIX) injection 40 mg  40 mg IntraVENous ONCE        Allergies   Allergen Reactions    Statins-Hmg-Coa Reductase Inhibitors Other (comments)     Per report from [address] [facility]        Review of Systems:  Review of systems not obtained due to patient factors. Subjective:     Visit Vitals  /76   Pulse 99   Temp 100 °F (37.8 °C)   Resp 18   Ht 5' 8\" (1.727 m)   Wt 192 lb 3.9 oz (87.2 kg)   SpO2 99%   BMI 29.23 kg/m²        Physical Exam:  Visit Vitals  /76   Pulse 99   Temp 100 °F (37.8 °C)   Resp 18   Ht 5' 8\" (1.727 m)   Wt 192 lb 3.9 oz (87.2 kg)   SpO2 99%   BMI 29.23 kg/m²     General Appearance:  Vent, sedated   Ears/Nose/Mouth/Throat:   ETT in place         Chest:   Scatted rhonchi,vent   Cardiovascular:  Regular tachy, S1, S2 normal, no murmur. Abdomen:   Soft, non-tender, bowel sounds are active. Extremities: No edema bilaterally. Skin: Warm and dry. Cardiographics:  Telemetry: sinus tachycardia  ECG: nonspecific ST and T waves changes, sinus tachycardia  Echocardiogram: pending    Data Reviewed: All lab results for the last 24 hours reviewed.      Assessment:         Hospital Problems  Never Reviewed          Codes Class Noted POA    Acute respiratory failure with hypoxia [facility]) ICD-10-CM: J96.01  ICD-9-CM: 518.81  11/29/2021 Unknown               Plan: Respiratory failure. Vent, sedated. Unclear etiology. PNA versus pulmonary edema, etc.  NSTEMI. Optimize supportive care, ASA, heparin gtt. Serial troponin and echo. Will need cardiac cath at some point, hopefully when more stable and off pressors. Suspect cardiogenic shock. Benefit from trial IV diuresis.   DKA

## 2021-11-30 NOTE — PROGRESS NOTES
Comprehensive Nutrition Assessment    Type and Reason for Visit: Initial    Nutrition Recommendations/Plan:    1. Begin tube feeds:   Recommend beginning tube feeding: Glucerna @ 45ml/hr with 1 packet of ProSource BID + 100 ml flush q4hr    Nutrition Assessment:    PMHx includes T2DM, HTN, tobacco use. Pt transferred from OSH after being admitted for progressive SOB and chest discomfort. He was found to be hypoxic with bilateral infiltrates, Covid-19 ruled out. Unable to tolerate BiPAP, intubated on 11/29. BNP and troponin found to be elevated d/t NSTEMI, cardiology following. Also with DKA, insulin infusion started. Pt on propofol @ 13.1ml/hr which provides 346kcal daily. Recommend beginning tube feeding: Glucerna @ 45ml/hr with 1 packet of ProSource BID + 100 ml flush q4hr. This goal with propofol will provide: 990 ml, 1951 kcal, 112 g protein, 132 g CHO, and 1471 ml free water (751 ml from TF + 720 ml from flush) to meet estimated daily needs. Once sodium WNL will increase water flush. Noted Hgb A1c is 9.0, indicating poor DM control PTA. Once able to wean propofol, recommend following order: Glucerna @ 60ml/hr + 100 ml flush q4hr. This goal will provide: 1320 ml, 1980 kcal, 109 g protein, 176 g CHO, and 1600 ml free water (1000 ml from TF + 600 ml from flush) to meet estimated daily needs. Spoke with pt's wife at bedside. She reported that her  had been eating very well up until he was taken to the hospital. She noted that he does not appear to have lost any significant amount of wt recently. Performed a NFPE and found no significant muscle/fat wasting. Malnutrition Assessment:  Malnutrition Status:  No malnutrition    Context:  Acute illness       Nutritionally Significant Medications: cefepime, pepcid, fentanyl, lantus, humalog, propofol @13.1ml/hr (346kcal)    Estimated Daily Nutrient Needs:  Energy (kcal): [facility] 2003b;  Weight Used for Energy Requirements: Current (87 kg)  Protein (g): 104-122 (1.2-1.4 g/kg); Weight Used for Protein Requirements: Current (87 kg)  Fluid (ml/day): 2000; Method Used for Fluid Requirements: 1 ml/kcal    Nutrition Related Findings:       BM: PTA -11/28  Edema: none  Wounds:  None       Current Nutrition Therapies:   Diet: None  Supplements/Nutrition Support: none  Additional Caloric Sources: propofol @ 13.1 ml/hr      Anthropometric Measures:  · Height:  5' 8\" (172.7 cm)  · Current Body Wt:  87 kg (191 lb 12.8 oz)   · Admission Body Wt:  191 lb 12.8 oz    · Ideal Body Wt:  154:  124.5 %   · BMI Categories:  Overweight (BMI 25.0-29. 9)     Wt Readings from Last 10 Encounters:   11/30/21 87 kg (191 lb 12.8 oz)     Nutrition Diagnosis:   · Inadequate oral intake related to impaired respiratory function, cardiac dysfunction as evidenced by intubation, nutrition support-enteral nutrition    Nutrition Interventions:   Food and/or Nutrient Delivery: Start tube feeding  Nutrition Education and Counseling: No recommendations at this time  Coordination of Nutrition Care: Continue to monitor while inpatient    Goals:  EN to meet 75% of estimated needs in 5-7 days. Nutrition Monitoring and Evaluation:   Behavioral-Environmental Outcomes: None identified  Food/Nutrient Intake Outcomes: Enteral nutrition intake/tolerance  Physical Signs/Symptoms Outcomes: Biochemical data, GI status    Discharge Planning:     Too soon to determine     Grace Loeladio Brito, Dietetic Intern)

## 2021-11-30 NOTE — PROGRESS NOTES
1430 Report received [facility] Per report, labs revealed elevated Troponin and EKG with ST depression in lateral leads but pt not on anticoagulation nor has seen a Cardiologist at that facility. Pt expected to be medical flight transported to CCU ETA 1600.      1700 Pt arrives to CCU. On flight ventilator settings: R16, V450, P10, FiO2 80%. On flight gtts are as follows: Levo @16, Fent @ 50, Insulin @ 4, Prop @ 10, D5+0.45NS @ 150. Dr. Elizabeth Ngo at bedside. Labs drawn, EKG obtained, CXR ordered. ABGs completed by RT and ventilator setting adjusted. Primary Nurse Tyson Lam, RN and Kim, RN performed a dual skin assessment on this patient. No impairment noted (Fahad score is 15). 1830 Labs and EKG reviewed with Dr. Elizabeth Ngo. MD s/w Cardiologist. Heparin gtt and ASA ordered. Lantus ordered and MD states to stop insulin gtt immediately after administering Lantus. IVF changed from D5+0.45NS to 0.9NS. IV abx ordered after paired BC obtained and sent. Weaning Levophed as tolerated. 1900 S/w pt's spouse. Updated information in chart. 1930 Bedside and Verbal shift change report given to Beulah Castrejon RN (oncoming nurse) by Jeannie Hoffman RN (offgoing nurse). Report included the following information SBAR. Levo @10, Fent @ 50, Heparin @ 11, Prop @ 25, NS @ 75, KVOx2 @5.

## 2021-12-01 NOTE — PROGRESS NOTES
Transitions of Care Plan   RUR: 10% - low   Admission Dx: Respiratory Failure   Consults: Neurology; Neurosurgery; Cardiology   Baseline: independent; resides with wife   Barriers to Discharge: medical   Disposition: pending medical progress; critical   Estimated Discharge Date: 2+ days    Reason for Admission:  Respiratory Failure w hypoxia                   RUR Score:          10% - low           Plan for utilizing home health:    No      PCP: First and Last name:  Unknown, Provider, MD     Name of Practice:    Are you a current patient: Yes/No:    Approximate date of last visit:    Can you participate in a virtual visit with your PCP:                     Current Advanced Directive/Advance Care Plan: Full Code      Healthcare Decision Maker:   Click here to complete [place] including selection of the Healthcare Decision Maker Relationship (ie \"Primary\")           Wife - Jessica Hull - p: [phone]                  Transition of Care Plan:                      Chart review assessment - CM spoke with CCU RN - patient suffered devastating stroke today with grim prognosis; wife at bedside awaiting for children to come to [facility]. Patient was independent at baseline. Resides with spouse. Supportive children. Resides near [facility]. CM will continue to remotely follow. Lucero Melgoza, MPH  Care Manager [facility]  Available via Swirl or  [employer]    Care Management Interventions  PCP Verified by CM:  Yes  Palliative Care Criteria Met (RRAT>21 & CHF Dx)?: No  Mode of Transport at Discharge: BLS  Transition of Care Consult (CM Consult): Discharge Planning  MyChart Signup: No  Discharge Durable Medical Equipment: No  Health Maintenance Reviewed: Yes  Physical Therapy Consult: No  Occupational Therapy Consult: No  Speech Therapy Consult: No  Support Systems: Spouse/Significant Other, Child(cristiane)  Confirm Follow Up Transport: Family  Discharge Location  Discharge Placement: [address] [place] [address]

## 2021-12-01 NOTE — PROGRESS NOTES
[facility]    ICU TEAM Progress Note    Name: Miles Garcia   : 1955   MRN: [id number]   Date: 2021      I  Subjective:   Progress Note: 2021      Reason for ICU Admission: Transferred from outside facility and respiratory failure, on mechanical ventilation    Interval history: From [facility] on  73year-old gentleman with past medical history of diabetes mellitus type 2, hypertension and tobacco use who presented to outside facility in the morning on  as he was developing progressive shortness of breath and chest discomfort. Apparently for the last few days he is having progressive dyspnea as described in his chart. In the outside facility they found to be hypoxic with bilateral infiltrate, COVID-19 was ruled out and he was placed on BiPAP but unfortunately could not tolerated and had to be intubated. Further work-up showed DKA as well as elevated BNP and troponin. He was started on insulin infusion. DKA protocol, reportedly EKG there showed ST segment elevation in aVR and depression in lateral leads. There was no cardiology service in the other hospital but repeated troponin dropped to 0.3 from 1.2 after intubation. Patient was given cefepime and vancomycin and blood culture was obtained as well. Overnight Events:     -vent support increased overnight, was on a PEEP of 15 this morning, this morning/afternoon nurse noticed unequal pupils-a CT head done showed a devastating right-sided stroke with midline shift, on/off pressors    : Some improvement, DKA resolved, off pressors, still requiring high ventilatory setting but able to wean. Received 1 dose of diuretics with good results. Troponin started to trend down, kidney function improving.         Objective:   Vital Signs:  Visit Vitals  /75   Pulse 94   Temp 100 °F (37.8 °C)   Resp 24   Ht 5' 8\" (1.727 m)   Wt 88.5 kg (195 lb 1.7 oz)   SpO2 100%   BMI 29.67 kg/m²      O2 Device: Endotracheal tube, Ventilator Temp (24hrs), Av.6 °F (37.6 °C), Min:99 °F (37.2 °C), Max:100.7 °F (38.2 °C)           Intake/Output:     Intake/Output Summary (Last 24 hours) at 2021 1531  Last data filed at 2021 1500  Gross per 24 hour   Intake 2300.17 ml   Output 3860 ml   Net -1559.83 ml       Physical Exam:   General-intubated, now off sedation  Neuro-pupils sluggish-right greater than left, no cough or gag at this time, not withdrawing in extremities albeit sedation was turned off recently  Cardiac-tachycardic, regular  Lungs-clear anteriorly  Abdomen-soft, nondistended, seemingly nontender  Extremities-warm      LABS AND  DATA: Personally reviewed  Recent Labs     21  040   WBC 12.7* 10.1   HGB 13.0 11.6*   HCT 39.1 34.3*    199     Recent Labs     21  1324 21  0248 21  0401 21  0401 21  1708 21  1708   * 132*   < > 135*   < > 134*   K 4.4 4.8   < > 4.3   < > 5.1    104   < > 109*   < > 108   CO2 20* 18*   < > 18*   < > 20*   BUN 26* 26*   < > 28*   < > 31*   CREA 1.32* 1.27   < > 1.37*   < > 1.82*   * 222*   < > 188*   < > 243*   CA 8.1* 8.3*   < > 7.9*   < > 7.9*   MG  --  1.6  --  1.6   < > 1.8   PHOS  --   --   --   --   --  2.7    < > = values in this interval not displayed. Recent Labs     21  040   AP 73 53   TP 7.4 6.3*   ALB 3.0* 3.0*   GLOB 4.4* 3.3     Recent Labs     21  1140 21  0141   APTT 59.3* 42.7*      Recent Labs     21  0415 21  0022   PHI 7.26* 7.30*   PCO2I 46.4* 34.7*   PO2I 51* 104*   FIO2I 90 70     No results for input(s): CPK, CKMB, TROIQ, BNPP in the last 72 hours.     Hemodynamics:   PAP:   CO:     Wedge:   CI:     CVP:    SVR:       PVR:       Ventilator Settings:  Mode Rate Tidal Volume Pressure FiO2 PEEP   Assist control, Volume control   480 ml    100 % 10 cm H20     Peak airway pressure: 35 cm H2O    Minute ventilation: 11.9 l/min        MEDS: Reviewed    Chest X-Ray:  CXR Results  (Last 48 hours)               12/01/21 0239  XR CHEST PORT Final result    Impression:      Stable bilateral lung opacities. Narrative:  EXAM:  XR CHEST PORT       INDICATION: Intubated. Bilateral lung opacities. COMPARISON: 11/30/2021 at 0421 hours       TECHNIQUE: Portable AP semiupright chest view at 0225 hours       FINDINGS: The endotracheal tube, enteric tube, and right IJ catheter are stable. The cardiomediastinal contours are stable. There are stable bilateral lung opacities. There is no pleural effusion or   pneumothorax. The bones and upper abdomen are stable. 11/30/21 0438  XR CHEST PORT Final result    Impression:  Multilobar pneumonia. Narrative:  PORTABLE CHEST RADIOGRAPH/S: 11/30/2021 4:38 AM       INDICATION: Ventilator, pneumonia. COMPARISON: 11/29/2021. TECHNIQUE: Portable frontal semiupright radiograph/s of the chest.       FINDINGS:    An ET tube, NG tube, and right IJ central line are in appropriate position. An   esophageal temperature probe extends to the thoracic inlet. There is hazy   airspace opacity in the bilateral mid and lower lung fields. The central airways   are patent. No pneumothorax. 11/29/21 1732  XR CHEST PORT Final result    Impression:  Bilateral hilar pneumonia       Narrative:  EXAM: XR CHEST PORT       INDICATION: respiratory failure       COMPARISON: None. FINDINGS: A portable AP radiograph of the chest was obtained at 1728 hours. The   patient is on a cardiac monitor. The endotracheal tube terminates group home   between the thoracic inlet and darnell. The NG tube extends below the   hemidiaphragm. The right IJ line terminates at the cavoatrial junction. There is   bilateral hilar airspace disease, asymmetric to the right The cardiac and   mediastinal contours and pulmonary vascularity are stable. The bones and soft   tissues are grossly within normal limits. ECHO:  Pending      Assessment and Plan:   Septic shock  Acute hypoxic respiratory failure  DKA  Acute kidney injury  Hyperkalemia  Elevated troponin [non-STEMI versus type II MI]  Hypertriglyceridemia:  Elevated transaminases:        Neuro-unequal pupils this morning/afternoon    CT head done showed Extensive hypodensity in the right frontal and right temporal lobes extending into the right parietal lobe with right to left midline shift of approximately 10 mm and effacement of the right lateral ventricle. Subfalcine herniation. Likely hypodensity in the right midbrain and kristina as well consistent with brainstem infarct.     Serial neuro checks, keep head of bed elevated, will hyperventilate for now, given mannitol this morning, keep sodium between 145 and 155, neurology and neurosurgery called, seizure precautions    Cardiac-map goal greater than 65, currently requiring norepinephrine, NSTEMI-EF 25%, troponins getting better, at this time high risk of hemorrhagic conversion of stroke-Heparin GTT held, follow-up cardiology recommendations    Pulmonary-difficult to stay within lung protective parameters, CT chest showed bilateral effusions, currently requiring high PEEP, as above-hyperventilating for now    GI-tube feeds as tolerated, undulating leukocytosis-monitor, H2 RA GI prophylaxis    Renal-monitor urine output closely, dose medication renally, hold Bumex for now now that he is getting mannitol, correct electrolyte derangements as needed, serial serum osmolality checks, will keep serum awesome between 310 and 320 with hypertonic saline    Hematology-as above-hold on heparin drip for now, will start chemical DVT prophylaxis tomorrow    ID-undulating white count, positive for rhinovirus and enterovirus, send sputum cultures if possible, continue vancomycin and cefepime, add doxycycline for possible atypical pneumonia as well    Endocrinology-keep glucose less than 180    Disposition-prognosis very guarded to poor at this time, wife and son Mita Spann updated      NOTE OF PERSONAL INVOLVEMENT IN CARE   This patient has a high probability of imminent, clinically significant deterioration, which requires the highest level of preparedness to intervene urgently. I participated in the decision-making and personally managed or directed the management of the following life and organ supporting interventions that required my frequent assessment to treat or prevent imminent deterioration. I personally spent 80 minutes of critical care time.  This does not include any procedural time    Signed By: Sarina Lawton MD     December 1, 2021

## 2021-12-01 NOTE — PROGRESS NOTES
Cardiology Progress Note            Admit Date: 11/29/2021  Admit Diagnosis: Acute respiratory failure with hypoxia ([facility] [address] [J96.01]  Date: 12/1/2021     Time: 1:43 PM    Interval HPI: Pt developed HTN and tachycardia (sinus tachycardia) overnight, per nursing report, pt was difficult to sedate. On a.m. rounds, pt was sedated on mechanical ventilator. Requiring high PEEP pressures this a.m. His BP was low and vasopressors were being re-initiated. Later this a.m., it was noted that pt had unequal pupils and CT head done with large CVA in rt MCA distribution with subfalcine herniation. Assessment and Plan     1. NSTEMI   -Troponin trending down (now 1915 from peak 10,700)   -Repeat EKG today sinus tachycardia with LBBB. -Stop ACS heparin given new CVA with herniation findings on CT head and risk of hemorrhagic conversion . He is currently on ASA 81 mg daily.    -Will cancel LHC for now given neurological findings   -No statin-history of statin intolerance/allergy per records     2. Cardiomyopathy, acute systolic CHF   -EF 55-09%, mild MR, moderately dilated LV   -NYHA IV   -Bumex on hold (pro BNP 1900)   -labile BP with episodes of Low-low NL BP limits GDMT    3. Hypotension/shock   -BP labile today. Back on levophed this a.m., now off this afternoon.    -suspect cardiogenic,  Perhaps neurologic process also contributing. 4. CVA, Rt MCA distribution. -neuro consulted and following.   -Mannitol planned due to cerebral edema. 5. Acute hypoxic respiratory failure:   -on vent, sedated. 6. DKA   -A1C=9   -glucose improved. 7. Elevated creatinine/MARYURI   -resolved. 8. Dyslipidemia/hypertriglyceridemia   -HDL 34, triglycerides 265, LDL 66   -no statin given statin allergy and transaminitis    Unfortunate neurologic events noted as above. Will stop heparin given risk of hemorrhagic conversion.   Will cancel cath for now and await further evaluation of neurologic status/course. Lanie Begum NP    Saw and evaluated pt and agree with above assessment and plan. Neuro findings noted. Cath on hold. Will need to assess goals of care. Probably involve palliative care. Vipin Lemus MD    Cardiac testin21    ECHO ADULT COMPLETE 2021    Interpretation Summary  · LV: Severely and globally reduced systolic function. Estimated LVEF is 20 - 25%. Moderately dilated left ventricle. · MV: Mild mitral valve regurgitation is present. Signed by: Marge Brooks MD on 2021 11:37 AM          PMH  DM  No past medical history on file. Social Hx  Social History     Socioeconomic History    Marital status:      Spouse name: Not on file    Number of children: Not on file    Years of education: Not on file    Highest education level: Not on file   Occupational History    Not on file   Tobacco Use    Smoking status: Not on file    Smokeless tobacco: Not on file   Substance and Sexual Activity    Alcohol use: Not on file    Drug use: Not on file    Sexual activity: Not on file   Other Topics Concern    Not on file   Social History Narrative    Not on file     Social Determinants of Health     Financial Resource Strain:     Difficulty of Paying Living Expenses: Not on file   Food Insecurity:     Worried About Running Out of Food in the Last Year: Not on file    [place] of Food in the Last Year: Not on file   Transportation Needs:     Lack of Transportation (Medical): Not on file    Lack of Transportation (Non-Medical):  Not on file   Physical Activity:     Days of Exercise per Week: Not on file    Minutes of Exercise per Session: Not on file   Stress:     Feeling of Stress : Not on file   Social Connections:     Frequency of Communication with Friends and Family: Not on file    Frequency of Social Gatherings with Friends and Family: Not on file    Attends [employer] Services: Not on file   [facility] Active Member of Clubs or Organizations: Not on file    Attends Club or Organization Meetings: Not on file    Marital Status: Not on file   Intimate Partner Violence:     Fear of Current or Ex-Partner: Not on file    Emotionally Abused: Not on file    Physically Abused: Not on file    Sexually Abused: Not on file   Housing Stability:     Unable to Pay for Housing in the Last Year: Not on file    Number of [place] in the Last Year: Not on file    Unstable Housing in the Last Year: Not on file       ROS:  Unable to obtain due to patient factors. Objective:      Physical Exam:                Visit Vitals  BP (!) 152/95   Pulse (!) 118   Temp (!) 100.7 °F (38.2 °C)   Resp 24   Ht 5' 8\" (1.727 m)   Wt 195 lb 1.7 oz (88.5 kg)   SpO2 100%   BMI 29.67 kg/m²          General Appearance:   Well developed, sedated. Mechanically ventilated. .   Ears/Nose/Mouth/Throat:    Oral ET tube in place. Neck:  Supple. Chest:    Lungs clear  auscultation bilaterally. Cardiovascular:   Regular rate and rhythm, S1, S2 normal, no murmur. Abdomen:    Soft, nondistended. Extremities:  No edema bilaterally. Skin:  Warm and dry. Telemetry: sinus tachycardia.            Data Review:    Labs:    Recent Results (from the past 24 hour(s))   GLUCOSE, POC    Collection Time: 11/30/21  2:59 PM   Result Value Ref Range    Glucose (POC) 154 (H) 65 - 117 mg/dL    Performed by Frosty Goldmann    PTT    Collection Time: 11/30/21  4:57 PM   Result Value Ref Range    aPTT 51.0 (H) 22.1 - 31.0 sec    aPTT, therapeutic range     58.0 - 77.0 SECS   VANCOMYCIN, RANDOM    Collection Time: 11/30/21  4:57 PM   Result Value Ref Range    Vancomycin, random 7.9 UG/ML   GLUCOSE, POC    Collection Time: 11/30/21  6:10 PM   Result Value Ref Range    Glucose (POC) 124 (H) 65 - 117 mg/dL    Performed by [address], POC    Collection Time: 11/30/21 11:27 PM   Result Value Ref Range    Glucose (POC) 175 (H) 65 - 117 mg/dL    Performed by Trino Stager    PTT    Collection Time: 12/01/21  1:41 AM   Result Value Ref Range    aPTT 42.7 (H) 22.1 - 31.0 sec    aPTT, therapeutic range     58.0 - 77.0 SECS   EKG, 12 LEAD, INITIAL    Collection Time: 12/01/21  2:36 AM   Result Value Ref Range    Ventricular Rate 143 BPM    Atrial Rate 143 BPM    P-R Interval 112 ms    QRS Duration 128 ms    Q-T Interval 310 ms    QTC Calculation (Bezet) 478 ms    Calculated R Axis 20 degrees    Calculated T Axis 130 degrees    Diagnosis       Sinus tachycardia  Nonspecific intraventricular block  T wave abnormality, consider lateral ischemia  When compared with ECG of 29-NOV-2021 17:58,  ST no longer depressed in Anterior leads  Nonspecific T wave abnormality now evident in Inferior leads  T wave inversion more evident in Lateral leads     LACTIC ACID    Collection Time: 12/01/21  2:48 AM   Result Value Ref Range    Lactic acid 1.3 0.4 - 2.0 MMOL/L   MAGNESIUM    Collection Time: 12/01/21  2:48 AM   Result Value Ref Range    Magnesium 1.6 1.6 - 2.4 mg/dL   NT-PRO BNP    Collection Time: 12/01/21  2:48 AM   Result Value Ref Range    NT pro-BNP 1,963 (H) <125 PG/ML   CBC WITH AUTOMATED DIFF    Collection Time: 12/01/21  2:48 AM   Result Value Ref Range    WBC 12.7 (H) 4.1 - 11.1 K/uL    RBC 4.23 4. 10 - 5.70 M/uL    HGB 13.0 12.1 - 17.0 g/dL    HCT 39.1 36.6 - 50.3 %    MCV 92.4 80.0 - 99.0 FL    MCH 30.7 26.0 - 34.0 PG    MCHC 33.2 30.0 - 36.5 g/dL    RDW 11.9 11.5 - 14.5 %    PLATELET 373 267 - 945 K/uL    MPV 9.0 8.9 - 12.9 FL    NRBC 0.0 0  WBC    ABSOLUTE NRBC 0.00 0.00 - 0.01 K/uL    NEUTROPHILS 86 (H) 32 - 75 %    LYMPHOCYTES 7 (L) 12 - 49 %    MONOCYTES 6 5 - 13 %    EOSINOPHILS 0 0 - 7 %    BASOPHILS 1 0 - 1 %    IMMATURE GRANULOCYTES 0 0.0 - 0.5 %    ABS. NEUTROPHILS 11.0 (H) 1.8 - 8.0 K/UL    ABS. LYMPHOCYTES 0.9 0.8 - 3.5 K/UL    ABS. MONOCYTES 0.7 0.0 - 1.0 K/UL    ABS. EOSINOPHILS 0.0 0.0 - 0.4 K/UL    ABS.  BASOPHILS 0.1 0.0 - 0.1 K/UL    ABS. IMM. GRANS. 0.0 0.00 - 0.04 K/UL    DF AUTOMATED     METABOLIC PANEL, COMPREHENSIVE    Collection Time: 12/01/21  2:48 AM   Result Value Ref Range    Sodium 132 (L) 136 - 145 mmol/L    Potassium 4.8 3.5 - 5.1 mmol/L    Chloride 104 97 - 108 mmol/L    CO2 18 (L) 21 - 32 mmol/L    Anion gap 10 5 - 15 mmol/L    Glucose 222 (H) 65 - 100 mg/dL    BUN 26 (H) 6 - 20 MG/DL    Creatinine 1.27 0.70 - 1.30 MG/DL    BUN/Creatinine ratio 20 12 - 20      GFR est AA >60 >60 ml/min/1.73m2    GFR est non-AA 57 (L) >60 ml/min/1.73m2    Calcium 8.3 (L) 8.5 - 10.1 MG/DL    Bilirubin, total 0.6 0.2 - 1.0 MG/DL    ALT (SGPT) 1,002 (H) 12 - 78 U/L    AST (SGOT) 549 (H) 15 - 37 U/L    Alk.  phosphatase 73 45 - 117 U/L    Protein, total 7.4 6.4 - 8.2 g/dL    Albumin 3.0 (L) 3.5 - 5.0 g/dL    Globulin 4.4 (H) 2.0 - 4.0 g/dL    A-G Ratio 0.7 (L) 1.1 - 2.2     PROCALCITONIN    Collection Time: 12/01/21  2:48 AM   Result Value Ref Range    Procalcitonin 0.85 ng/mL   TROPONIN-HIGH SENSITIVITY    Collection Time: 12/01/21  2:48 AM   Result Value Ref Range    Troponin-High Sensitivity 1,915 (HH) 0 - 76 ng/L   MYCOPLASMA AB, IGM    Collection Time: 12/01/21  2:48 AM   Result Value Ref Range    Mycoplasma Ab, IgM NONREACTIVE NR     VITAMIN D, 25 HYDROXY    Collection Time: 12/01/21  2:48 AM   Result Value Ref Range    Vitamin D 25-Hydroxy 30.5 30 - 100 ng/mL   TSH 3RD GENERATION    Collection Time: 12/01/21  2:48 AM   Result Value Ref Range    TSH 0.84 0.36 - 3.74 uIU/mL   GLUCOSE, POC    Collection Time: 12/01/21  3:32 AM   Result Value Ref Range    Glucose (POC) 196 (H) 65 - 117 mg/dL    Performed by Alexia Starr    POC G3 - PUL    Collection Time: 12/01/21  4:15 AM   Result Value Ref Range    FIO2 (POC) 90 %    pH (POC) 7.26 (L) 7.35 - 7.45      pCO2 (POC) 46.4 (H) 35.0 - 45.0 MMHG    pO2 (POC) 51 (L) 80 - 100 MMHG    HCO3 (POC) 20.6 (L) 22 - 26 MMOL/L    sO2 (POC) 79.4 (L) 92 - 97 %    Base deficit (POC) 6.6 mmol/L Site DRAWN FROM ARTERIAL LINE      Device: ADULT VENT      Mode ASSIST CONTROL      Tidal volume 480 ml    Set Rate 16 bpm    PEEP/CPAP (POC) 14 cmH2O    Allens test (POC) NOT APPLICABLE      Specimen type (POC) ARTERIAL     GLUCOSE, POC    Collection Time: 12/01/21  6:30 AM   Result Value Ref Range    Glucose (POC) 224 (H) 65 - 117 mg/dL    Performed by Michelle Kelly    PTT    Collection Time: 12/01/21 11:40 AM   Result Value Ref Range    aPTT 59.3 (H) 22.1 - 31.0 sec    aPTT, therapeutic range     58.0 - 77.0 SECS   GLUCOSE, POC    Collection Time: 12/01/21 12:23 PM   Result Value Ref Range    Glucose (POC) 222 (H) 65 - 117 mg/dL    Performed by Beatrice Alfonso           Radiology:        Current Facility-Administered Medications   Medication Dose Route Frequency    nicotine (NICODERM CQ) 14 mg/24 hr patch 1 Patch  1 Patch TransDERmal DAILY    doxycycline (VIBRAMYCIN) 100 mg in 0.9% sodium chloride (MBP/ADV) 100 mL MBP  100 mg IntraVENous Q12H    insulin glargine (LANTUS) injection 25 Units  25 Units SubCUTAneous DAILY    Vancomycin random level 12/1 @ 1900   Other ONCE    acetaminophen (TYLENOL) solution 650 mg  650 mg Per NG tube Q4H PRN    levothyroxine (SYNTHROID) tablet 137 mcg  137 mcg Oral 6am    gabapentin (NEURONTIN) capsule 300 mg  300 mg Oral TID    docusate (COLACE) 50 mg/5 mL oral liquid 100 mg  100 mg Oral DAILY    polyethylene glycol (MIRALAX) packet 17 g  17 g Oral DAILY    mannitol (OSMITROL) 20 % infusion 88.5 g  1 g/kg IntraVENous ONCE    [Held by provider] bumetanide (BUMEX) injection 1 mg  1 mg IntraVENous BID    aspirin chewable tablet 81 mg  81 mg Per NG tube DAILY    sodium chloride (NS) flush 5-40 mL  5-40 mL IntraVENous Q8H    sodium chloride (NS) flush 5-40 mL  5-40 mL IntraVENous PRN    acetaminophen (TYLENOL) tablet 650 mg  650 mg Oral Q6H PRN    Or    acetaminophen (TYLENOL) suppository 650 mg  650 mg Rectal Q6H PRN    polyethylene glycol (MIRALAX) packet 17 g  17 g Oral DAILY PRN    ondansetron (ZOFRAN ODT) tablet 4 mg  4 mg Oral Q8H PRN    Or    ondansetron (ZOFRAN) injection 4 mg  4 mg IntraVENous Q6H PRN    famotidine (PEPCID) tablet 20 mg  20 mg Oral Q12H    chlorhexidine (ORAL CARE KIT) 0.12 % mouthwash 15 mL  15 mL Oral Q12H    cefepime (MAXIPIME) 2 g in 0.9% sodium chloride 10 mL IV syringe  2 g IntraVENous Q12H    propofol (DIPRIVAN) 10 mg/mL infusion  0-50 mcg/kg/min IntraVENous TITRATE    NOREPINephrine (LEVOPHED) 8 mg in 5% dextrose 250mL (32 mcg/mL) infusion  0.5-30 mcg/min IntraVENous TITRATE    fentaNYL (PF) 1,500 mcg/30 mL (50 mcg/mL) infusion  0-200 mcg/hr IntraVENous TITRATE    [Held by provider] heparin 25,000 units in D5W 250 ml infusion  11-25 Units/kg/hr IntraVENous TITRATE    glucose chewable tablet 16 g  4 Tablet Oral PRN    dextrose (D50W) injection syrg 12.5-25 g  25-50 mL IntraVENous PRN    glucagon (GLUCAGEN) injection 1 mg  1 mg IntraMUSCular PRN    insulin lispro (HUMALOG) injection   SubCUTAneous Q4H    Vancomycin - pharmacy to dose   Other Rx Dosing/Monitoring          Evon Dakins.  WAYNE Begum     [facility] [address] [place] [address] [address]   Mee Chapa   [phone]

## 2021-12-01 NOTE — PROGRESS NOTES
Bedside shift change report given to Andrez Umanzor RN (oncoming nurse) by Najma Turner RN (offgoing nurse). Report included the following information SBAR, Kardex, ED Summary, Intake/Output, MAR and Recent Results. SHIFT SUMMARY:    0800 Initial Shift  Assessment performed           Mental Status: Intubated. Heavily sedated for vent compliance. Will start to wean and assess. Absent gag. Pupils equal.            Respiratory: FiO2 100%           Cardiac: Sinus tach           GI/: Wilder           IV Drips: Fentanyl 200mcg/hr, Propofol 50mcg/kg/hr, Heparin 20u/kg/hr    1020 Patient remains with out gag despite decrease in propofol. Right pupil appears irregularly shaped and pupils appear fixed to this RN. Alerted intensivist who I putting in orders for stat CT of head. 1035 Patient being taken down for stat head CT with this RN and RT.   1100 Patient back in room and hooked to monitor. 1118 Received call from radiologist concerning CT. See report for more details. 1140 Intensivist at bedside updating wife on scan results. Head in 45 degree position, lights off in room, and sedation stopped per intensivist to evaluate neuro status. 1150 Neurology at bedside. 1200 Neuro surgery at bedside. [address] with cardiology NP and holding heparin gtt at this time. 1335 Neuro Surgery at bedside with wife. Patients sons on their way to hospital and plan for possible comfort care this evening. 1600  at bedside. 1900 Sons at bedside with wife. Bedside shift change report given to Belkis Nice RN (oncoming nurse) by Andrez Umanzor RN (offgoing nurse). Report included the following information SBAR, Kardex, ED Summary, Intake/Output, MAR and Recent Results.

## 2021-12-01 NOTE — INTERDISCIPLINARY ROUNDS
Multidisciplinary rounds were held 12/1/21. Today's plan/goal includes (but not limited to): Assess neuro status.

## 2021-12-01 NOTE — PROGRESS NOTES
2000 - Report received from NELIA Alvarez. VSS, no s/s of pain. 2100 - CHG bath given. Pt O2 desaturated to 80s w/ turns, slow recovery. 0100 - 0300 HR 130s, RR 40s-50s. O2 sats low 80s. Pt restless, asynchronous w/ vent. Sedation titrated. MD at bedside, FIO2 up to 100%, vent adjusted to SIMV, VC, changes per MD. EKG obtained, Sinus tach. Labs drawn. Versed & Dilaudid given. 25g Albumin given. 50mg Rocuronium given. 0400 - ABG drawn by RT. Results relayed to MD. PEEP adjusted per MD at bedside. Bedside and Verbal shift change report given to Nita Johnson RN (oncoming nurse) by Nikos Denton (offgoing nurse). Report included the following information SBAR, Kardex, Intake/Output, MAR, Accordion, Recent Results, Cardiac Rhythm -ST and Alarm Parameters .

## 2021-12-01 NOTE — CONSULTS
Consult dictated. 41-year-old with diabetes, hypertension, tobacco use who initially presented on 11/29 with progressive shortness of breath and chest discomfort. Work-up revealed diabetic ketoacidosis and elevated troponin. EKG showed ST elevation. Also had pulmonary infiltrates, got intubated and transferred. Has been on heparin for acute coronary syndrome. This morning staff noted irregularity of pupils and had a CT brain and it shows a large right middle cerebral artery distribution infarct with a 10 mm midline shift and subfalcine herniation. Clinically his right pupil is 3 to 4 mm, reactive, left pupil about 3 mm very sluggish. Very unfortunate sequence of events. Stroke likely occurred over 24 hours ago. Not candidate for acute stroke intervention. Due to evolving cerebral edema, mannitol has been ordered. May consider hypertonic saline later tonight. Agree with alerting neurosurgery but currently he does not appear to be a candidate for OR. Using heparin also becomes risky for fear of hemorrhagic transformation but seems necessary from cardiac standpoint. We will consider further stroke work-up including CT angiogram/MRI brain once and if situation stabilizes. High risk of deterioration. Discussed potential outcomes of such a stroke, if he survives, with his wife.     Francisco Rouse MD

## 2021-12-01 NOTE — PROGRESS NOTES
Day #3 of Vancomycin  Indication:  sepsis  Current regimen:  per levels  Abx regimen:  Vanc and Cefepime  ID Following ?: NO  Concomitant nephrotoxic drugs (requires more frequent monitoring): None  Frequency of BMP?: daily    Recent Labs     21  0248 21  0401 21  1708   WBC 12.7* 10.1 14.2*   CREA 1.27 1.37* 1.82*   BUN 26* 28* 31*     Est CrCl: 55-60 ml/min; UO: >1 ml/kg/hr  Temp (24hrs), Av.5 °F (37.5 °C), Min:99 °F (37.2 °C), Max:100.4 °F (38 °C)    Cultures:    blood - NGTD    MRSA Swab ordered (if applicable)? YES in process    Goal target range Trough 10-15 mcg/mL    Recent level history:  Date/Time Dose & Interval Measured Level (mcg/mL) Associated AUC/DARYL Dose Adjustment    [email or web address] Per level 7.9 N/a 1500 mg x 1 dose                                           Plan: Dose by levels. Random level ordered for this evening.

## 2021-12-01 NOTE — NURSE NAVIGATOR
Chart reviewed by Heart Failure Nurse Navigator. Heart Failure database completed. EF:  20/25%    ACEi/ARB/ARNi: low-low normal BP limits GDMT per Dr. Denae Sun    BB: low-low normal BP limits GDMT per Dr. Denae Sun    Aldosterone Antagonist: low - low normal BP limits GDMT per Dr. Denae Sun    Obstructive Sleep Apnea Screening: screening priority 1   STOP-BANG score:   Referred to Sleep Medicine:     CRT **. NYHA Functional Class IV on admission. Heart Failure Teach Back in Patient Education. Heart Failure Avoiding Triggers on Discharge Instructions. Cardiologist: Dr. Denae Sun (CAV)      Post discharge follow up phone call to be made within 48-72 hours of discharge.

## 2021-12-01 NOTE — CONSULTS
[address]    Name:  Angel Blank  MR#:  [id number]  :  1955  ACCOUNT #:  [de-identified]  DATE OF SERVICE:  2021      REQUESTING PHYSICIAN:  Sonal Thompson MD    REASON FOR EVALUATION:   Stroke. HISTORY OF PRESENT ILLNESS:  The patient is a 26-year-old male with history of type 2 diabetes, hypertension, tobacco use, who initially presented to [facility] on the morning of  because of progressive shortness of breath and chest discomfort. He was found to be hypoxic with bilateral pulmonary infiltrates. He had to be intubated. Further workup showed that he had diabetic ketoacidosis and elevated troponin. EKG showed ST-segment elevation and he was transferred to this hospital for higher level of care. He had been intubated, sedated, on pressor support and on heparin for acute coronary syndrome. This morning the nursing staff noted that his pupils were showing irregularities, that prompted a CT brain. It showed a large right hemispheric infarction with midline shift and subfalcine herniation. Neurology was asked to evaluate. PAST MEDICAL HISTORY:  As mentioned above. ALLERGIES:  STATINS. HOME MEDICATIONS:  1. Levothyroxine. 2.  Glipizide. 3.  Lisinopril HCTZ. 4.  Amlodipine. 5.  Metformin. 6.  Gabapentin. 7.  Multivitamins. SOCIAL HISTORY:  The patient is apparently quite active at baseline and was cutting wood not too long before the incident. He does use tobacco.  No significant alcohol use. Lives with his wife. PHYSICAL EXAMINATION:  GENERAL:  The patient is intubated and unresponsive. VITAL SIGNS:  Blood pressure 152/95, temperature 100.7, pulse is 118. HEART:  Regular rate and rhythm. CHEST:  Clear. ABDOMEN:  Soft. Positive bowel sounds. EXTREMITIES:  No edema. NEUROLOGIC:  Pupil is 4-5 mm on the right side and briskly reactive to light, left pupil is 3-4 mm and sluggish reaction to light. All extremities are flaccid. Does not follow any commands. Reflexes mute. Sensory, cerebellar, and gait exam was difficult. LABORATORY DATA:  CBC with WBC 12.7, hemoglobin is 13.0, hematocrit is 39.1, platelets of 008. Chemistry:  Sodium 132, potassium 4.8, BUN 26, creatinine 1.27. , ALT 1002. Hemoglobin A1c is 9.0. Lipid Profile: Total cholesterol 153, triglycerides 265, LDL 66. Troponin elevated at 1915 and down from 10,757. CT brain imaging independently reviewed. There is extensive area of evolving infarction in the right frontal and temporal lobes with a midline shift of approximately 10 mm. There is subfalcine herniation noted. There is also hypodensity in the right mid brain and kristina seen. Echocardiogram shows an ejection fraction of 20-25%. There is mildly dilated left atrium. ASSESSMENT AND PLAN:  A 50-year-old male with history of diabetes, hypertension, tobacco use, and now congestive heart failure. He initially presented on 11/29 with progressive shortness of breath and chest discomfort. Workup revealed diabetic ketoacidosis, pulmonary infiltrates, elevated troponin with EKG showing ST elevation. He was intubated and transferred for higher level of care. He has been on heparin. This morning, he was noted to have irregularity of pupils that prompted a CT brain and it shows a large right middle cerebral artery distribution infarct with a 10-mm midline shift and subfalcine herniation. Clinically, his pupils are overall unremarkable with reaction to light seen on the right and minimal to sluggish reaction on the left. This is very unfortunate sequence of events. Stroke likely occurred over 24 hours ago and suspect cardioembolism. He is not a candidate for any acute stroke intervention. Due to evolving cerebral edema, mannitol has been ordered. May consider hypertonic saline later tonight.   Agree with alerting Neurosurgery, but currently he does not appear to be a candidate for operating room. Using heparin also becomes risky for fear of hemorrhagic transformation but seems necessary from cardiac standpoint. We will consider further stroke workup including CT angiogram of the head and neck and MRI brain once and if his situation stabilizes. He remains at a high risk of deterioration and this was discussed with the patient's wife including potential outcomes of such a large stroke if he survives. Thank you for this consultation.       Tonya Smith MD      [id number]  D:  12/01/2021 13:02  T:  12/01/2021 15:41  JOB #:  [id number]

## 2021-12-01 NOTE — CONSULTS
Neurosurgery  Pt seen and examined, full consult to follow. Devastating, large MCA stroke with minimal brain stem function.   I do not think a hemicraniectomy will change his neurologic outcome

## 2021-12-01 NOTE — PROGRESS NOTES
Notified by nurse of pt's new neurologic findings and CT head results. Discussed with Dr. Rasheed marroquin to hold heparin. Full progress note to follow. Will also defer LHC for now pending neurologic evaluation.

## 2021-12-02 LAB
L PNEUMO1 AG UR QL IA: NEGATIVE
SPECIMEN SOURCE: NORMAL

## 2021-12-02 NOTE — CONSULTS
[address]    Name:  Drew Ye  MR#:  [id number]  :  1955  ACCOUNT #:  [de-identified]  DATE OF SERVICE:  2021    REASON FOR CONSULTATION:  Right MCA stroke. HISTORY OF PRESENT ILLNESS:  The patient is a 64-year-old gentleman. History is mainly from the chart  as he is not able to give me any history now. He was transferred from an outside hospital and admitted here where he was intubated for respiratory failure as well as possibly non-STEMI. He was admitted on . At that point, history says he was sedated, moves four limbs to painful stimuli. At some point, he had enlarged pupils. Later had a CT scan which showed a large right MCA stroke with possibility of brainstem infarction as well, though difficult to see. I was called for evaluation. He has been off all sedation for several hours now, and we did not do anything neurologically. His pupils are small, but nonreactive, but no other neurologic findings, and no movement to painful stimuli. I was called for evaluation for hemicraniectomy. PAST MEDICAL HISTORY:  Significant for,  1. Diabetes. 2.  Hypertension. MEDICATIONS PRIOR TO ADMISSION:  Extensive on the chart and did not include any blood thinners. ALLERGIES:  STATINS. SOCIAL HISTORY:  Positive tobacco.    FAMILY HISTORY:  Noncontributory. REVIEW OF SYSTEMS:  Unable to obtain as he is intubated. PHYSICAL EXAMINATION:  GENERAL:  He is a gentleman intubated, in bed. He does not have any eye opening to painful stimuli. NEUROLOGIC:   His pupils are 3 mm and nonreactive. He has negative corneal sign. He has no gag, no cough. He does not have any movement on any four limbs to painful, deep stimuli. The rest of his neurologic exam is unable to obtain secondary to his neurologic status.     LABORATORY DATA:  CT scan shows large right MCA stroke with significant mass  and shift, reading says questionable brainstem involvement. IMPRESSION:  Large right middle cerebral artery stroke. RECOMMENDATION:  I had a long discussion with the wife as well as one son by phone. I discussed that he really has very poor neurologic status. I do not think a hemicraniectomy would convert him back to good neurologic recovery or probably any neurologic recovery if he does survive that. We discussed the mechanism of injury, underlying brain problem. He really does not have much brainstem function now, but I think that is his major problem. He would not undergo any surgery. I think this is a grim prognosis. They are going to have the sons come in to the hospital, they are about 4 hours away and should be here this evening, and make decisions about comfort care.       Syl Wsahington MD      [id number]  D:  12/01/2021 14:54  T:  12/01/2021 20:07  JOB #:  [id number]

## 2021-12-02 NOTE — DEATH NOTE
Pt Name  Blaine Baker   Admit date:  11/29/2021   Date and time of death:  12/1/2021 @ 2044   Room Number  8978/29    Medical Record Number  [id number] @ . [address]   Age  73 y.o. Date of Birth 1955   PCP Unknown, Provider, MD   Attending physician Azalea Oneal MD      Code Status  DNR    Patient seen and examined     Mental status   Unresponsive    Pupils Dilated and Fixed    Respiration Nil    Pulse  Absent     Heart Sounds  Absent    Rhythm  Flat line   Family  Notified by Nursing staff    Chaplan Service  Notified by Nursing staff     Death certificate and discharge summary completion remain  Mainor Seay MD's responsibility.                                  12/1/2021

## 2021-12-02 NOTE — PROGRESS NOTES
1930: Bedside and Verbal shift change report given to Adan Quezada RN (oncoming nurse) by Lilia Cazares RN (offgoing nurse). Report included the following information SBAR, Kardex, ED Summary, OR Summary, Procedure Summary, Intake/Output, MAR, Recent Results, Cardiac Rhythm SR and Alarm Parameters . 2015: Family at bedside. Expressed desire to withdraw care at this time. 2024: RT at bedside. Pt extubated to RA.    2044: Edna Peoples MD at bedside to pronounce TOD. 2054: [employer] called. Spoke to [employer].

## 2021-12-02 NOTE — DISCHARGE SUMMARY
Discharge Summary     Patient: Kassandra Azul       MRN: [id number]       YOB: 1955       Age: 73 y.o. Date of admission:  11/29/2021    Date of discharge:  12/1/2021    Primary care provider:  Unknown, Provider, MD     Admitting provider:  Cydney Majano MD    Discharging provider(s): Mahnaz Chong MD      Consultations  · IP CONSULT TO NEUROLOGY  · IP CONSULT TO NEUROSURGERY    Discharge destination: [facility]    Admission diagnosis  · Acute respiratory failure with hypoxia ([facility] [address] [J96.01]    Please refer to the admission history and physical for details on the presenting problem. Final discharge diagnoses and brief hospital course    - Acute Ischemic Stroke - Right MCA  - Acute Hypoxic Respiratory Failure  - Septic Shock  - Acute Kidney Injury  - Hyperkalemia  - Elevated Troponin  - Hypertriglyceridemia  - Elevation in Liver Enzymes    18-year-old gentleman with past medical history of diabetes mellitus type 2, hypertension and tobacco use who presented to outside facility in the morning on November 29 as he was developing progressive shortness of breath and chest discomfort.  Apparently for the last few days he is having progressive dyspnea as described in his chart. In the outside facility they found to be hypoxic with bilateral infiltrate, COVID-19 was ruled out and he was placed on BiPAP but unfortunately could not tolerated and had to be intubated.  Further work-up showed DKA as well as elevated BNP and troponin. He was started on insulin infusion.  DKA protocol, reportedly EKG there showed ST segment elevation in aVR and depression in lateral leads.  There was no cardiology service in the other hospital but repeated troponin dropped to 0.3 from 1.2 after intubation. Patient was given cefepime and vancomycin and blood culture was obtained as well. He began to improve on 11/30 - DKA resolved and kidney/cardiac function improving.  Unfortunately on 12/1 he developed worsening ventilatory requirements and was found to have unequal pupils. CT head showed completed devastating R MCA stroke. Neurology and Neurosurgery were consulted with no intervention recommended. Heparin gtt was held for risk of hemorraghic conversion. [address] conversations on  - family elected to withdraw care. He was extubated early in the evening on  and  peacefully at  on 21 surrounded by his friends and family. Physical examination at discharge  Visit Vitals  /70   Pulse 81   Temp 96.9 °F (36.1 °C)   Resp 24   Ht 5' 8\" (1.727 m)   Wt 88.5 kg (195 lb 1.7 oz)   SpO2 100%   BMI 29.67 kg/m²          Recent Labs     21  02421  04021  1708   WBC 12.7* 10.1 14.2*   HGB 13.0 11.6* 12.8   HCT 39.1 34.3* 38.4    199 271     Recent Labs     21  1324 21  0248 21  0401 21  1708 21  1708   * 132* 135*   < > 134*   K 4.4 4.8 4.3   < > 5.1    104 109*   < > 108   CO2 20* 18* 18*   < > 20*   BUN 26* 26* 28*   < > 31*   CREA 1.32* 1.27 1.37*   < > 1.82*   * 222* 188*   < > 243*   CA 8.1* 8.3* 7.9*   < > 7.9*   MG  --  1.6 1.6  --  1.8   PHOS  --   --   --   --  2.7    < > = values in this interval not displayed. Recent Labs     21  0248 21  04021  170   AP 73 53 56   TP 7.4 6.3* 6.8   ALB 3.0* 3.0* 3.2*   GLOB 4.4* 3.3 3.6     Recent Labs     21  1140 21  0141 21  1657   APTT 59.3* 42.7* 51.0*      Pertinent imaging studies:  CT Head 21: Extensive hypodensity in the right frontal and right temporal lobes extending into the right parietal lobe with right to left midline shift of approximately 10 mm and effacement of the right lateral ventricle. Subfalcine herniation. Likely hypodensity in the right midbrain and kristina as well consistent with  brainstem infarct.   Further delineation with MRI of the brain when/if clinically feasible is recommended. ---------------------------------    Chronic Diagnoses:    Problem List as of 12/1/2021 Never Reviewed          Codes Class Noted - Resolved    Acute respiratory failure with hypoxia [facility]) ICD-10-CM: J96.01  ICD-9-CM: 518.81  11/29/2021 - Present              Time spent on discharge related activities today greater than 30 minutes.       Signed:      Diana Juan MD   Staff [facility]    12/1/2021   8:46 PM

## 2021-12-04 LAB
BACTERIA SPEC CULT: NORMAL
SERVICE CMNT-IMP: NORMAL
